# Patient Record
Sex: FEMALE | Race: WHITE | Employment: UNEMPLOYED | URBAN - METROPOLITAN AREA
[De-identification: names, ages, dates, MRNs, and addresses within clinical notes are randomized per-mention and may not be internally consistent; named-entity substitution may affect disease eponyms.]

---

## 2020-05-03 NOTE — PROGRESS NOTES
Physician on call answering page. Patient describing 8 days of intermittent Shortness of breath with mild runny nose without other symptoms of fever,cough, sore throat or n/v/d.  Advised patient that if her condition does not improve she should go to the ER

## 2020-05-05 NOTE — TELEPHONE ENCOUNTER
Action Requested: Summary for Provider     []  Critical Lab, Recommendations Needed  [] Need Additional Advice  []   FYI    []   Need Orders  [] Need Medications Sent to Pharmacy  []  Other     SUMMARY:   With  [id number]   Patient has not

## 2020-05-05 NOTE — PROGRESS NOTES
Virtual Telephone Check-In    [address] [facility] verbally consents to a Virtual/Telephone Check-In visit on 05/05/20.     Patient understands and accepts financial responsibility for any deductible, co-insurance and/or co-pays associated with this s shortness of breath, denies cough  CARDIOVASCULAR: denies chest pain  GI: denies abdominal pain and denies heartburn - positive diarrhea  NEURO: denies headaches  Musculoskeletal: no joint pain, back pain    EXAM:   There were no vitals taken for this visi

## 2020-05-12 NOTE — TELEPHONE ENCOUNTER
Dr. Blanquita Bergeron:    Patient seeking abx or other recommendations. Patient states she feels her chest congested. Feels like she has a need to cough, but unable to. Feels cold sensation in chest.  Discomfort. Denied coughing spells.      Also sees blisters in

## 2020-05-12 NOTE — TELEPHONE ENCOUNTER
Spoke with  Nataliia Alonzo ID # [id number], pt  verified. Pt informed of MD recommendation, pt stated understanding.

## 2020-08-19 NOTE — PROGRESS NOTES
8/19/2020  10:06 AM    Gely Mortensen is a 63year old female. Chief complaint(s): Patient presents with:  Routine Physical: PAP    HPI:     Gely Mortensen primary complaint is regarding CPE.      Gely Mortensen is a 5 CURETTAGE  2010   • HYSTEROSCOPY     • HYSTEROSCOPY  2010    Per [employer]:  \"hysteroscopy - D&C. \"   •   ,    • OTHER Right     MOHS left eye   • OTHER SURGICAL HISTORY Left 2010    (left breast cancer) surgery      Famil Positive for abdominal pain (bloated). Negative for heartburn, nausea, vomiting, constipation and blood in stool. Endocrine: Negative for polydipsia and polyuria. Genitourinary: Positive for bladder incontinence.  Negative for dysuria, vaginal discharge Musculoskeletal:      Comments: Spine without scoliosis or kyphosis. Range of motions of both upper and lower extremities are normal.   Lymphadenopathy:   There are no cervical, axillary or inguinal lymphadenopathy. Lower extremities without edema.    [place] [facility] dental care with her dentist), and healthy habits & social competence & responsibilities: Recommendations on physical activity; exercise daily or at least 3 times a week for 30-60 minutes doing cardiovascular exercise.  Patient educated on self breast exami

## 2021-03-29 NOTE — PROGRESS NOTES
3/29/2021  2:02 PM    Samanta Cardona is a 62year old female.     Chief complaint(s): Patient presents with:  Shortness Of Breath: SOB, chest tightness, denies any cough,     HPI:     Alease Crystal primary complaint is regarding mul eye   • OTHER SURGICAL HISTORY Left 07/2010    (left breast cancer) surgery      Family History   Problem Relation Age of Onset   • Cancer Sister         Family h/o Thyroid Cancer   • Cancer Brother         Family h/o Liver Cancer   • Breast Cancer Other and wheezing. Cardiovascular: Negative for chest pain. Gastrointestinal: Positive for abdominal pain and constipation. Negative for nausea and vomiting. Musculoskeletal: Negative for back pain. Skin: Negative for rash.         Fatty tumor   Neurolo 72 46 - 118 U/L    Bilirubin, Total 0.5 0.1 - 2.0 mg/dL    Total Protein 7.6 6.4 - 8.2 g/dL    Albumin 4.3 3.4 - 5.0 g/dL    Globulin  3.3 2.8 - 4.4 g/dL    A/G Ratio 1.3 1.0 - 2.0    FASTING Yes    HEMOGLOBIN A1C   Result Value Ref Range    HgbA1C 5.2 <5. 4.00 x10(3) uL    Monocyte Absolute 0.33 0.10 - 1.00 x10(3) uL    Eosinophil Absolute 0.09 0.00 - 0.70 x10(3) uL    Basophil Absolute 0.03 0.00 - 0.20 x10(3) uL    Immature Granulocyte Absolute 0.02 0.00 - 1.00 x10(3) uL    Neutrophil % 63.0 %    Lymphocyt [facility] if there is a deterioration or worsening of the medical condition. Also, inform the doctor with any new symptoms or medications' side effects.       FOLLOW-UP: Schedule a follow-up visit in  prn.        5. Lipoma of left lower extremity  RTC

## 2021-04-16 ENCOUNTER — TELEPHONE (OUTPATIENT)
Dept: FAMILY MEDICINE CLINIC | Facility: CLINIC | Age: 61
End: 2021-04-16

## 2021-04-19 NOTE — TELEPHONE ENCOUNTER
Phone call made s/t  pt to help schedule appt for procedure. Per patient she would like to hold the procedure and call back since she is scheduled to receive her COVID vaccine.

## 2021-09-07 NOTE — PROGRESS NOTES
9/7/2021  1:46 PM    Rodney Smallwood is a 62year old female. Chief complaint(s): Patient presents with:  Breathing Problem    HPI:     Rodney Smallwood primary complaint is regarding SOB.      Patient is a 59-year-old female who pre Sister         Family h/o Thyroid Cancer   • Cancer Brother         Family h/o Liver Cancer   • Breast Cancer Other    • Heart Disease Mother 80   • Other (accidental death) Father 77   • Other (oral cancer) Brother    • Other (alive and well) Son pain.   Gastrointestinal: Negative for abdominal pain, nausea and vomiting. Musculoskeletal: Negative for back pain. Skin: Negative for rash. Neurological: Negative for dizziness and headaches. Psychiatric/Behavioral: Negative for dysphoric mood.  Sylvia Minaya Visit:  Requested Prescriptions      No prescriptions requested or ordered in this encounter       Imaging & Referrals:  Marsha Hardin MD

## 2022-04-11 ENCOUNTER — HOSPITAL ENCOUNTER (OUTPATIENT)
Dept: CT IMAGING | Facility: HOSPITAL | Age: 62
Discharge: HOME | End: 2022-04-11
Attending: FAMILY MEDICINE

## 2022-04-11 ENCOUNTER — HOSPITAL ENCOUNTER (OUTPATIENT)
Dept: MAMMOGRAPHY | Facility: HOSPITAL | Age: 62
Discharge: HOME | End: 2022-04-11
Attending: FAMILY MEDICINE

## 2022-04-11 DIAGNOSIS — R10.32 ABDOMINAL PAIN, LLQ: ICD-10-CM

## 2022-04-11 DIAGNOSIS — Z85.3 HISTORY OF LEFT BREAST CANCER: ICD-10-CM

## 2022-04-11 LAB — CREAT BLD-MCNC: 0.6 MG/DL

## 2022-04-11 PROCEDURE — 77063 BREAST TOMOSYNTHESIS BI: CPT | Performed by: FAMILY MEDICINE

## 2022-04-11 PROCEDURE — 82565 ASSAY OF CREATININE: CPT

## 2022-04-11 PROCEDURE — 74177 CT ABD & PELVIS W/CONTRAST: CPT | Performed by: FAMILY MEDICINE

## 2022-04-11 PROCEDURE — 71260 CT THORAX DX C+: CPT | Performed by: FAMILY MEDICINE

## 2022-04-11 PROCEDURE — 77067 SCR MAMMO BI INCL CAD: CPT | Performed by: FAMILY MEDICINE

## 2022-04-12 ENCOUNTER — TELEPHONE (OUTPATIENT)
Dept: FAMILY MEDICINE CLINIC | Facility: CLINIC | Age: 62
End: 2022-04-12

## 2022-04-18 ENCOUNTER — OFFICE VISIT (OUTPATIENT)
Dept: FAMILY MEDICINE CLINIC | Facility: CLINIC | Age: 62
End: 2022-04-18

## 2022-04-18 VITALS
SYSTOLIC BLOOD PRESSURE: 109 MMHG | WEIGHT: 110.19 LBS | HEIGHT: 67 IN | DIASTOLIC BLOOD PRESSURE: 62 MMHG | BODY MASS INDEX: 17.29 KG/M2 | TEMPERATURE: 98 F | HEART RATE: 82 BPM

## 2022-04-18 DIAGNOSIS — K76.9 LIVER LESION: ICD-10-CM

## 2022-04-18 DIAGNOSIS — R10.32 ABDOMINAL PAIN, LLQ: Primary | ICD-10-CM

## 2022-04-18 DIAGNOSIS — K42.9 UMBILICAL HERNIA WITHOUT OBSTRUCTION AND WITHOUT GANGRENE: ICD-10-CM

## 2022-04-18 DIAGNOSIS — Z85.3 HISTORY OF LEFT BREAST CANCER: ICD-10-CM

## 2022-04-18 PROCEDURE — 3074F SYST BP LT 130 MM HG: CPT | Performed by: FAMILY MEDICINE

## 2022-04-18 PROCEDURE — 3078F DIAST BP <80 MM HG: CPT | Performed by: FAMILY MEDICINE

## 2022-04-18 PROCEDURE — 3008F BODY MASS INDEX DOCD: CPT | Performed by: FAMILY MEDICINE

## 2022-04-18 PROCEDURE — 99213 OFFICE O/P EST LOW 20 MIN: CPT | Performed by: FAMILY MEDICINE

## 2022-05-18 ENCOUNTER — TELEPHONE (OUTPATIENT)
Dept: HEMATOLOGY/ONCOLOGY | Facility: HOSPITAL | Age: 62
End: 2022-05-18

## 2022-05-18 NOTE — TELEPHONE ENCOUNTER
Patient called using language line Tj Beltran #[id number]). Patient scheduled for consult appointment with Dr. Larissa Alas 5/27/22 at 2pm. Will have scheduling call patient to schedule MRI of liver ordered by Dr. Woo Jacobs prior to consult appointment. Patient verbalizes understanding.

## 2022-05-18 NOTE — TELEPHONE ENCOUNTER
Patient called and wanted a consult with Dr. Lucia King. Dr. Nadya Mckeon ordered a CT scan. He is not positive patient has cancer, but he did see some tumors in patient's liver. The patient has seen you in the past. Can you please give me a consult date when you have a chance. Thank you.

## 2022-05-27 ENCOUNTER — APPOINTMENT (OUTPATIENT)
Dept: HEMATOLOGY/ONCOLOGY | Facility: HOSPITAL | Age: 62
End: 2022-05-27
Attending: INTERNAL MEDICINE
Payer: COMMERCIAL

## 2022-05-27 VITALS
OXYGEN SATURATION: 99 % | TEMPERATURE: 98 F | WEIGHT: 109 LBS | DIASTOLIC BLOOD PRESSURE: 61 MMHG | HEART RATE: 74 BPM | RESPIRATION RATE: 16 BRPM | SYSTOLIC BLOOD PRESSURE: 121 MMHG | HEIGHT: 67 IN | BODY MASS INDEX: 17.11 KG/M2

## 2022-05-27 DIAGNOSIS — R22.1 MASS OF THYROID REGION: ICD-10-CM

## 2022-05-27 DIAGNOSIS — K76.9 LESION OF LIVER: Primary | ICD-10-CM

## 2022-05-27 DIAGNOSIS — Z85.3 HISTORY OF LEFT BREAST CANCER: ICD-10-CM

## 2022-05-27 PROCEDURE — 99211 OFF/OP EST MAY X REQ PHY/QHP: CPT

## 2022-05-27 RX ORDER — LORAZEPAM 0.5 MG/1
TABLET ORAL
Qty: 2 TABLET | Refills: 0 | Status: SHIPPED | OUTPATIENT
Start: 2022-05-27

## 2022-06-16 ENCOUNTER — TELEPHONE (OUTPATIENT)
Dept: HEMATOLOGY/ONCOLOGY | Facility: HOSPITAL | Age: 62
End: 2022-06-16

## 2022-06-16 NOTE — TELEPHONE ENCOUNTER
Called patient and discussed that results of the MRI of the liver done on a bright light which will be scanned in the system, was consistent with liver lesions being hemangiomas. Discussed with the patient that this is not cancer and that she does not need any further work-up. She states she did do the ultrasound of the neck there as well, I have not gotten the report. We will call to get the report and I will call her back with that information. If everything is negative and no appearance of malignancy, no further follow-up with myself, she can continue follow-up with her primary care doctor, Dr. Ronnie Germain.   She states she still having abdominal issues and I recommend that that she asked Dr. Ronnie Germain for referral for a gastroenterologist.

## 2022-06-16 NOTE — TELEPHONE ENCOUNTER
Called back patient and discussed that US of the neck, the nodule that the patient had pointed out on the R  Neck is 2.5 cm and is solid/almost completely solid. This was recommended for ultrasound-guided fine-needle aspiration as was felt to be suspicious. I discussed this with the patient I will be referring the patient back to her primary care doctor for further work-up on this thyroid nodule.

## 2022-06-22 ENCOUNTER — TELEPHONE (OUTPATIENT)
Dept: HEMATOLOGY/ONCOLOGY | Facility: HOSPITAL | Age: 62
End: 2022-06-22

## 2022-06-22 NOTE — TELEPHONE ENCOUNTER
Patient needs a referral from Dr. Tom Contreras for a biopsy and ultrasound. Please advise patient when done.

## 2022-06-22 NOTE — TELEPHONE ENCOUNTER
Called patient with use of the language line interpretor Morena [id number] that Dr. Arch Sandifer did review the scans and is referring patient back to PCP to order biopsy and US- patient stated understanding and will call PCP. Provided our number to call back if she has any further questions.

## 2022-06-24 ENCOUNTER — TELEPHONE (OUTPATIENT)
Dept: FAMILY MEDICINE CLINIC | Facility: CLINIC | Age: 62
End: 2022-06-24

## 2022-06-24 NOTE — TELEPHONE ENCOUNTER
Pt would like to know if Dr. Siva Anglin can give her an order for an ultra sound and biopsy of the thyroid. Per the patient Dr. Donald Phillips told her she should request the order from her PCP. Please, call pt when the order is in the system.

## 2022-07-12 ENCOUNTER — OFFICE VISIT (OUTPATIENT)
Dept: FAMILY MEDICINE CLINIC | Facility: CLINIC | Age: 62
End: 2022-07-12
Payer: COMMERCIAL

## 2022-07-12 ENCOUNTER — LAB ENCOUNTER (OUTPATIENT)
Dept: LAB | Age: 62
End: 2022-07-12
Attending: FAMILY MEDICINE
Payer: COMMERCIAL

## 2022-07-12 VITALS
HEART RATE: 66 BPM | DIASTOLIC BLOOD PRESSURE: 77 MMHG | WEIGHT: 104.19 LBS | BODY MASS INDEX: 16.35 KG/M2 | HEIGHT: 67 IN | SYSTOLIC BLOOD PRESSURE: 134 MMHG

## 2022-07-12 DIAGNOSIS — Z12.11 COLON CANCER SCREENING: ICD-10-CM

## 2022-07-12 DIAGNOSIS — Z00.00 PHYSICAL EXAM: Primary | ICD-10-CM

## 2022-07-12 DIAGNOSIS — R19.4 CHANGE IN BOWEL HABIT: ICD-10-CM

## 2022-07-12 DIAGNOSIS — E04.1 THYROID NODULE: ICD-10-CM

## 2022-07-12 DIAGNOSIS — Z00.00 PHYSICAL EXAM: ICD-10-CM

## 2022-07-12 LAB
ALBUMIN SERPL-MCNC: 4.3 G/DL (ref 3.4–5)
ALBUMIN/GLOB SERPL: 1.2 {RATIO} (ref 1–2)
ALP LIVER SERPL-CCNC: 69 U/L
ALT SERPL-CCNC: 29 U/L
ANION GAP SERPL CALC-SCNC: 7 MMOL/L (ref 0–18)
AST SERPL-CCNC: 18 U/L (ref 15–37)
BASOPHILS # BLD AUTO: 0.05 X10(3) UL (ref 0–0.2)
BASOPHILS NFR BLD AUTO: 0.7 %
BILIRUB SERPL-MCNC: 0.7 MG/DL (ref 0.1–2)
BILIRUB UR QL: NEGATIVE
BUN BLD-MCNC: 10 MG/DL (ref 7–18)
BUN/CREAT SERPL: 13.7 (ref 10–20)
CALCIUM BLD-MCNC: 9.5 MG/DL (ref 8.5–10.1)
CHLORIDE SERPL-SCNC: 108 MMOL/L (ref 98–112)
CHOLEST SERPL-MCNC: 138 MG/DL (ref ?–200)
CLARITY UR: CLEAR
CO2 SERPL-SCNC: 27 MMOL/L (ref 21–32)
COLOR UR: YELLOW
CREAT BLD-MCNC: 0.73 MG/DL
DEPRECATED RDW RBC AUTO: 41 FL (ref 35.1–46.3)
EOSINOPHIL # BLD AUTO: 0.12 X10(3) UL (ref 0–0.7)
EOSINOPHIL NFR BLD AUTO: 1.7 %
ERYTHROCYTE [DISTWIDTH] IN BLOOD BY AUTOMATED COUNT: 12 % (ref 11–15)
EST. AVERAGE GLUCOSE BLD GHB EST-MCNC: 111 MG/DL (ref 68–126)
FASTING PATIENT LIPID ANSWER: YES
FASTING STATUS PATIENT QL REPORTED: YES
GLOBULIN PLAS-MCNC: 3.5 G/DL (ref 2.8–4.4)
GLUCOSE BLD-MCNC: 102 MG/DL (ref 70–99)
GLUCOSE UR-MCNC: NEGATIVE MG/DL
HBA1C MFR BLD: 5.5 % (ref ?–5.7)
HCT VFR BLD AUTO: 39.4 %
HDLC SERPL-MCNC: 48 MG/DL (ref 40–59)
HGB BLD-MCNC: 12.7 G/DL
IMM GRANULOCYTES # BLD AUTO: 0.03 X10(3) UL (ref 0–1)
IMM GRANULOCYTES NFR BLD: 0.4 %
KETONES UR-MCNC: NEGATIVE MG/DL
LDLC SERPL CALC-MCNC: 73 MG/DL (ref ?–100)
LYMPHOCYTES # BLD AUTO: 1.44 X10(3) UL (ref 1–4)
LYMPHOCYTES NFR BLD AUTO: 20.1 %
MCH RBC QN AUTO: 30.1 PG (ref 26–34)
MCHC RBC AUTO-ENTMCNC: 32.2 G/DL (ref 31–37)
MCV RBC AUTO: 93.4 FL
MONOCYTES # BLD AUTO: 0.37 X10(3) UL (ref 0.1–1)
MONOCYTES NFR BLD AUTO: 5.2 %
NEUTROPHILS # BLD AUTO: 5.15 X10 (3) UL (ref 1.5–7.7)
NEUTROPHILS # BLD AUTO: 5.15 X10(3) UL (ref 1.5–7.7)
NEUTROPHILS NFR BLD AUTO: 71.9 %
NITRITE UR QL STRIP.AUTO: NEGATIVE
NONHDLC SERPL-MCNC: 90 MG/DL (ref ?–130)
OSMOLALITY SERPL CALC.SUM OF ELEC: 293 MOSM/KG (ref 275–295)
PH UR: 6 [PH] (ref 5–8)
PLATELET # BLD AUTO: 136 10(3)UL (ref 150–450)
POTASSIUM SERPL-SCNC: 5.1 MMOL/L (ref 3.5–5.1)
PROT SERPL-MCNC: 7.8 G/DL (ref 6.4–8.2)
PROT UR-MCNC: NEGATIVE MG/DL
RBC # BLD AUTO: 4.22 X10(6)UL
SODIUM SERPL-SCNC: 142 MMOL/L (ref 136–145)
SP GR UR STRIP: 1.01 (ref 1–1.03)
TRIGL SERPL-MCNC: 88 MG/DL (ref 30–149)
TSI SER-ACNC: 2.81 MIU/ML (ref 0.36–3.74)
UROBILINOGEN UR STRIP-ACNC: <2
VIT C UR-MCNC: NEGATIVE MG/DL
VIT D+METAB SERPL-MCNC: 18.5 NG/ML (ref 30–100)
VLDLC SERPL CALC-MCNC: 14 MG/DL (ref 0–30)
WBC # BLD AUTO: 7.2 X10(3) UL (ref 4–11)

## 2022-07-12 PROCEDURE — 80053 COMPREHEN METABOLIC PANEL: CPT

## 2022-07-12 PROCEDURE — 84443 ASSAY THYROID STIM HORMONE: CPT

## 2022-07-12 PROCEDURE — 3008F BODY MASS INDEX DOCD: CPT | Performed by: FAMILY MEDICINE

## 2022-07-12 PROCEDURE — 99396 PREV VISIT EST AGE 40-64: CPT | Performed by: FAMILY MEDICINE

## 2022-07-12 PROCEDURE — 81001 URINALYSIS AUTO W/SCOPE: CPT

## 2022-07-12 PROCEDURE — 90471 IMMUNIZATION ADMIN: CPT | Performed by: FAMILY MEDICINE

## 2022-07-12 PROCEDURE — 36415 COLL VENOUS BLD VENIPUNCTURE: CPT

## 2022-07-12 PROCEDURE — 82306 VITAMIN D 25 HYDROXY: CPT

## 2022-07-12 PROCEDURE — 83036 HEMOGLOBIN GLYCOSYLATED A1C: CPT

## 2022-07-12 PROCEDURE — 85025 COMPLETE CBC W/AUTO DIFF WBC: CPT

## 2022-07-12 PROCEDURE — 80061 LIPID PANEL: CPT

## 2022-07-12 PROCEDURE — 93010 ELECTROCARDIOGRAM REPORT: CPT | Performed by: FAMILY MEDICINE

## 2022-07-12 PROCEDURE — 3075F SYST BP GE 130 - 139MM HG: CPT | Performed by: FAMILY MEDICINE

## 2022-07-12 PROCEDURE — 90715 TDAP VACCINE 7 YRS/> IM: CPT | Performed by: FAMILY MEDICINE

## 2022-07-12 PROCEDURE — 87086 URINE CULTURE/COLONY COUNT: CPT

## 2022-07-12 PROCEDURE — 93005 ELECTROCARDIOGRAM TRACING: CPT

## 2022-07-12 PROCEDURE — 3078F DIAST BP <80 MM HG: CPT | Performed by: FAMILY MEDICINE

## 2022-07-12 RX ORDER — ERGOCALCIFEROL 1.25 MG/1
50000 CAPSULE ORAL WEEKLY
Qty: 12 CAPSULE | Refills: 4 | Status: SHIPPED | OUTPATIENT
Start: 2022-07-12 | End: 2022-08-11

## 2022-07-12 NOTE — PROGRESS NOTES
Please notify patient that his/her blood test showed low levels of vitamin D. A prescription was sent to his pharmacy to take one capsule or tablet, once a week. This Rx is good for one year. We'll recheck levels in one year.     Normal EKG

## 2022-07-14 ENCOUNTER — TELEPHONE (OUTPATIENT)
Dept: GASTROENTEROLOGY | Facility: CLINIC | Age: 62
End: 2022-07-14

## 2022-07-14 ENCOUNTER — OFFICE VISIT (OUTPATIENT)
Dept: GASTROENTEROLOGY | Facility: CLINIC | Age: 62
End: 2022-07-14

## 2022-07-14 VITALS
SYSTOLIC BLOOD PRESSURE: 119 MMHG | DIASTOLIC BLOOD PRESSURE: 67 MMHG | WEIGHT: 105 LBS | BODY MASS INDEX: 16.48 KG/M2 | HEART RATE: 76 BPM | HEIGHT: 67 IN

## 2022-07-14 DIAGNOSIS — R19.4 FREQUENT BOWEL MOVEMENTS: ICD-10-CM

## 2022-07-14 DIAGNOSIS — Z85.3 PERSONAL HISTORY OF MALIGNANT NEOPLASM OF BREAST: Primary | ICD-10-CM

## 2022-07-14 DIAGNOSIS — Z85.3 HISTORY OF BREAST CANCER: ICD-10-CM

## 2022-07-14 DIAGNOSIS — Z86.010 PERSONAL HISTORY OF COLONIC POLYPS: Primary | ICD-10-CM

## 2022-07-14 DIAGNOSIS — R10.9 LEFT FLANK PAIN: ICD-10-CM

## 2022-07-14 DIAGNOSIS — D18.03 HEPATIC HEMANGIOMA: ICD-10-CM

## 2022-07-14 DIAGNOSIS — R19.4 CHANGE IN BOWEL HABITS: ICD-10-CM

## 2022-07-14 DIAGNOSIS — Z86.010 PERSONAL HISTORY OF COLONIC POLYPS: ICD-10-CM

## 2022-07-14 DIAGNOSIS — R10.9 LEFT SIDED ABDOMINAL PAIN: ICD-10-CM

## 2022-07-14 PROCEDURE — 99244 OFF/OP CNSLTJ NEW/EST MOD 40: CPT | Performed by: NURSE PRACTITIONER

## 2022-07-14 PROCEDURE — 3074F SYST BP LT 130 MM HG: CPT | Performed by: NURSE PRACTITIONER

## 2022-07-14 PROCEDURE — 3008F BODY MASS INDEX DOCD: CPT | Performed by: NURSE PRACTITIONER

## 2022-07-14 PROCEDURE — 3078F DIAST BP <80 MM HG: CPT | Performed by: NURSE PRACTITIONER

## 2022-07-14 RX ORDER — POLYETHYLENE GLYCOL 3350, SODIUM CHLORIDE, SODIUM BICARBONATE, POTASSIUM CHLORIDE 420; 11.2; 5.72; 1.48 G/4L; G/4L; G/4L; G/4L
POWDER, FOR SOLUTION ORAL
Qty: 4000 ML | Refills: 0 | Status: SHIPPED | OUTPATIENT
Start: 2022-07-14

## 2022-07-14 NOTE — TELEPHONE ENCOUNTER
Scheduled for:  Colonoscopy 55562  Provider Name: Dr Tanmay Santacruz  Date: Wed 7/20/2022   Location: [facility]   Sedation: MAC  Time: 2 pm   Prep: split trilyte   Meds/Allergies Reconciled?:  Reviewed by provider  Diagnosis with codes: Left abd pain R10.9, Hx of breast cancer Z85.3, HCP Z86.010, change in bowel R19.4  Was patient informed to call insurance with codes (Y/N):  Yes  Referral sent?: Yes    [address] [place] [address] or [address] [place] [address] notified?:  I sent an electronic request to Endo Scheduling and received a confirmation today. Medication Orders: -Buy over the counter dulcolax laxative, and take one tablet daily for 3 days prior to drinking the bowel prep. Pt is aware to NOT take iron pills, herbal meds and diet supplements for 7 days before exam. Also to NOT take any form of alcohol, recreational drugs and any forms of ED meds 24 hours before exam.      Misc Orders:  Patient was informed that they will need a COVID 19 test prior to their procedure. Patient verbally understood & will await a phone call from [facility] to schedule. Further instructions given by staff:   Instructions given in Faroese and pt verbalized understanding

## 2022-07-14 NOTE — PATIENT INSTRUCTIONS
Plan:  -Start miralax in am and fibercon or citrucel in evening  -squatty potty  -If worsening pain and/or having fever/chills/ condition decline switch to liquid diet and report to er  -ultrasound with FNA        1. Schedule colonoscopy with [facility] w/ [facility] MD [Diagnosis: h/o breast ca, left sided pain, change in bowel habits, history of colon polyps]    2.  bowel prep from pharmacy (split trilyte -Buy over the counter dulcolax laxative, and take one tablet daily for 3 days prior to drinking the bowel prep. )    3. Continue all medications for procedure    4. Read all bowel prep instructions carefully    5. AVOID seeds, nuts, popcorn, raw fruits and vegetables (cooked is okay) for 2-3 days before procedure    6. You will need to be tested for COVID within 72 hours of your procedure. You will be contacted with instructions on how to do this.       >>>Please note: if you were prescribed Suprep for the bowel prep and it is too expensive or not covered by insurance, it is okay to substitute Trilyte (or any similar generic prep). This can be done by notifying the pharmacy or calling our office.

## 2022-07-20 ENCOUNTER — ANESTHESIA (OUTPATIENT)
Dept: ENDOSCOPY | Facility: HOSPITAL | Age: 62
End: 2022-07-20
Payer: COMMERCIAL

## 2022-07-20 ENCOUNTER — ANESTHESIA EVENT (OUTPATIENT)
Dept: ENDOSCOPY | Facility: HOSPITAL | Age: 62
End: 2022-07-20
Payer: COMMERCIAL

## 2022-07-20 ENCOUNTER — HOSPITAL ENCOUNTER (OUTPATIENT)
Facility: HOSPITAL | Age: 62
Setting detail: HOSPITAL OUTPATIENT SURGERY
Discharge: HOME | End: 2022-07-20
Attending: INTERNAL MEDICINE | Admitting: INTERNAL MEDICINE
Payer: COMMERCIAL

## 2022-07-20 VITALS
BODY MASS INDEX: 16.48 KG/M2 | SYSTOLIC BLOOD PRESSURE: 126 MMHG | RESPIRATION RATE: 12 BRPM | OXYGEN SATURATION: 100 % | DIASTOLIC BLOOD PRESSURE: 59 MMHG | WEIGHT: 105 LBS | HEART RATE: 60 BPM | HEIGHT: 67 IN

## 2022-07-20 DIAGNOSIS — R19.4 FREQUENT BOWEL MOVEMENTS: ICD-10-CM

## 2022-07-20 DIAGNOSIS — Q43.8 TORTUOUS COLON: Primary | ICD-10-CM

## 2022-07-20 DIAGNOSIS — Q43.8 REDUNDANT COLON: ICD-10-CM

## 2022-07-20 DIAGNOSIS — Z85.3 PERSONAL HISTORY OF MALIGNANT NEOPLASM OF BREAST: ICD-10-CM

## 2022-07-20 DIAGNOSIS — Z86.010 PERSONAL HISTORY OF COLONIC POLYPS: ICD-10-CM

## 2022-07-20 DIAGNOSIS — K63.5 POLYP OF TRANSVERSE COLON, UNSPECIFIED TYPE: ICD-10-CM

## 2022-07-20 DIAGNOSIS — R10.9 LEFT FLANK PAIN: ICD-10-CM

## 2022-07-20 PROCEDURE — 45380 COLONOSCOPY AND BIOPSY: CPT | Performed by: INTERNAL MEDICINE

## 2022-07-20 PROCEDURE — 0DBL8ZX EXCISION OF TRANSVERSE COLON, VIA NATURAL OR ARTIFICIAL OPENING ENDOSCOPIC, DIAGNOSTIC: ICD-10-PCS | Performed by: INTERNAL MEDICINE

## 2022-07-20 RX ORDER — SODIUM CHLORIDE, SODIUM LACTATE, POTASSIUM CHLORIDE, CALCIUM CHLORIDE 600; 310; 30; 20 MG/100ML; MG/100ML; MG/100ML; MG/100ML
INJECTION, SOLUTION INTRAVENOUS CONTINUOUS
Status: DISCONTINUED | OUTPATIENT
Start: 2022-07-20 | End: 2022-07-20

## 2022-07-20 RX ORDER — LIDOCAINE HYDROCHLORIDE 10 MG/ML
INJECTION, SOLUTION EPIDURAL; INFILTRATION; INTRACAUDAL; PERINEURAL AS NEEDED
Status: DISCONTINUED | OUTPATIENT
Start: 2022-07-20 | End: 2022-07-20 | Stop reason: SURG

## 2022-07-20 RX ADMIN — SODIUM CHLORIDE, SODIUM LACTATE, POTASSIUM CHLORIDE, CALCIUM CHLORIDE: 600; 310; 30; 20 INJECTION, SOLUTION INTRAVENOUS at 13:40:00

## 2022-07-20 RX ADMIN — LIDOCAINE HYDROCHLORIDE 50 MG: 10 INJECTION, SOLUTION EPIDURAL; INFILTRATION; INTRACAUDAL; PERINEURAL at 13:46:00

## 2022-07-20 RX ADMIN — SODIUM CHLORIDE, SODIUM LACTATE, POTASSIUM CHLORIDE, CALCIUM CHLORIDE: 600; 310; 30; 20 INJECTION, SOLUTION INTRAVENOUS at 14:51:00

## 2022-07-20 NOTE — ANESTHESIA POSTPROCEDURE EVALUATION
Patient: Mac Riley    Procedure Summary     Date: 07/20/22 Room / Location: [address] [place] [address] ENDOSCOPY 05 / [address] [place] [address] ENDOSCOPY    Anesthesia Start: 0520 Anesthesia Stop:     Procedure: COLONOSCOPY (N/A ) Diagnosis:       Personal history of malignant neoplasm of breast      Personal history of colonic polyps      Frequent bowel movements      Left flank pain      (redundant colon, tortuous colon, colon polyp, hemorrhoids)    Surgeons: Cecy Perla MD Anesthesiologist: Jasno Araiza CRNA    Anesthesia Type: MAC ASA Status: 2          Anesthesia Type: MAC    Vitals Value Taken Time   /59 07/20/22 1507   Temp  07/20/22 1507   Pulse 74 07/20/22 1507   Resp 20 07/20/22 1507   SpO2 98 % 07/20/22 1507       [facility] AN Post Evaluation:   Patient Evaluated in Patient location: endo 19. Patient Participation: complete - patient participated  Level of Consciousness: awake  Pain Score: 0  Pain Management: adequate  Airway Patency:patent  Dental exam unchanged from preop  Yes    Cardiovascular Status: stable  Respiratory Status: room air  Postoperative Hydration stable      Liarosalia Meade.  Rachel Chauhan CRNA  7/20/2022 3:07 PM

## 2022-07-27 ENCOUNTER — TELEPHONE (OUTPATIENT)
Dept: GASTROENTEROLOGY | Facility: CLINIC | Age: 62
End: 2022-07-27

## 2022-07-27 ENCOUNTER — TELEPHONE (OUTPATIENT)
Dept: FAMILY MEDICINE CLINIC | Facility: CLINIC | Age: 62
End: 2022-07-27

## 2022-07-27 NOTE — TELEPHONE ENCOUNTER
[facility] requesting a biopsy of the thyroid be entered for patient.     Fax to [phone]

## 2022-07-28 DIAGNOSIS — E04.1 THYROID NODULE: Primary | ICD-10-CM

## 2022-07-28 NOTE — TELEPHONE ENCOUNTER
Central scheduling called regarding US order, patient reported she did US thyroid. Was completed at [address] [place] [address] 6/9/22, see scan from 6/21. Per report recommendation: ultrasound-guided fine needle aspiration. Requesting order for US biopsy, please advise.

## 2022-08-02 ENCOUNTER — LAB ENCOUNTER (OUTPATIENT)
Dept: LAB | Age: 62
End: 2022-08-02
Attending: FAMILY MEDICINE

## 2022-08-02 DIAGNOSIS — E04.1 THYROID NODULE: ICD-10-CM

## 2022-08-02 LAB — SARS-COV-2 RNA RESP QL NAA+PROBE: NOT DETECTED

## 2022-08-05 ENCOUNTER — HOSPITAL ENCOUNTER (OUTPATIENT)
Dept: ULTRASOUND IMAGING | Facility: HOSPITAL | Age: 62
Discharge: HOME | End: 2022-08-05
Attending: FAMILY MEDICINE

## 2022-08-05 VITALS — DIASTOLIC BLOOD PRESSURE: 72 MMHG | SYSTOLIC BLOOD PRESSURE: 121 MMHG | HEART RATE: 64 BPM

## 2022-08-05 DIAGNOSIS — E04.1 THYROID NODULE: ICD-10-CM

## 2022-08-05 PROCEDURE — 10005 FNA BX W/US GDN 1ST LES: CPT | Performed by: FAMILY MEDICINE

## 2022-08-05 PROCEDURE — 88173 CYTOPATH EVAL FNA REPORT: CPT | Performed by: FAMILY MEDICINE

## 2022-08-05 PROCEDURE — 88172 CYTP DX EVAL FNA 1ST EA SITE: CPT | Performed by: FAMILY MEDICINE

## 2022-08-05 PROCEDURE — 88177 CYTP FNA EVAL EA ADDL: CPT | Performed by: FAMILY MEDICINE

## 2022-08-05 NOTE — IMAGING NOTE
Pt arrived to Radiology Holding area. 1307:/75, HR 71    1310 [employer], [id number] # [id number]. History taken and as follows: Pt felt a lump in her neck, had US which showed nodule. Procedure explained questions answered. 1324 Consent verified and obtained      1338 Pt to ultrasound room #3     1342 Scans taken by Rhett Gustafson, ultrasound sonographers     1430  scans reviewed by Dr. Ramachandran Serum    1412 7760 Time out taken; Heidi Shaulis tech translated for pt      Site marked [phone] Area cleaned sterile towels  to site. Pathology was notified     1402 Lidocaine 1% 10 milligrams per ml  from kit  was given 1 ml         FNA # 1 taken at  1407 with 22 g needle    FNA # 2 taken at [phone]  with 22 g needle    1411 Procedure completed area re scanned . Area cleaned band aid to site ice pack to site. [facility] reviewed, verbal et written with AVS summary sheet provided to patient. Also instructed patient to refrain from drinking or eating anything hot for several hours after biopsy  to prevent increase bleeding from occurring. 1418 /72, HR 64    1422 Pt  discharged .

## 2022-08-11 ENCOUNTER — OFFICE VISIT (OUTPATIENT)
Dept: FAMILY MEDICINE CLINIC | Facility: CLINIC | Age: 62
End: 2022-08-11

## 2022-08-11 VITALS
HEART RATE: 74 BPM | DIASTOLIC BLOOD PRESSURE: 72 MMHG | SYSTOLIC BLOOD PRESSURE: 138 MMHG | HEIGHT: 67 IN | BODY MASS INDEX: 16.13 KG/M2 | WEIGHT: 102.81 LBS

## 2022-08-11 DIAGNOSIS — K64.2 GRADE III HEMORRHOIDS: ICD-10-CM

## 2022-08-11 DIAGNOSIS — K59.01 SLOW TRANSIT CONSTIPATION: ICD-10-CM

## 2022-08-11 DIAGNOSIS — R22.1 NECK MASS: ICD-10-CM

## 2022-08-11 DIAGNOSIS — K58.2 IRRITABLE BOWEL SYNDROME WITH BOTH CONSTIPATION AND DIARRHEA: Primary | ICD-10-CM

## 2022-08-11 DIAGNOSIS — E04.1 THYROID NODULE: ICD-10-CM

## 2022-08-11 PROCEDURE — 3078F DIAST BP <80 MM HG: CPT | Performed by: FAMILY MEDICINE

## 2022-08-11 PROCEDURE — 3008F BODY MASS INDEX DOCD: CPT | Performed by: FAMILY MEDICINE

## 2022-08-11 PROCEDURE — 3075F SYST BP GE 130 - 139MM HG: CPT | Performed by: FAMILY MEDICINE

## 2022-08-11 PROCEDURE — 99214 OFFICE O/P EST MOD 30 MIN: CPT | Performed by: FAMILY MEDICINE

## 2022-08-11 RX ORDER — DICYCLOMINE HCL 20 MG
20 TABLET ORAL
Qty: 120 TABLET | Refills: 1 | Status: SHIPPED | OUTPATIENT
Start: 2022-08-11

## 2022-08-18 ENCOUNTER — OFFICE VISIT (OUTPATIENT)
Dept: OTOLARYNGOLOGY | Facility: CLINIC | Age: 62
End: 2022-08-18

## 2022-08-18 VITALS — HEIGHT: 67 IN | BODY MASS INDEX: 16.01 KG/M2 | TEMPERATURE: 98 F | WEIGHT: 102 LBS

## 2022-08-18 DIAGNOSIS — E04.1 THYROID NODULE: Primary | ICD-10-CM

## 2022-08-18 PROCEDURE — 99204 OFFICE O/P NEW MOD 45 MIN: CPT | Performed by: STUDENT IN AN ORGANIZED HEALTH CARE EDUCATION/TRAINING PROGRAM

## 2022-08-19 ENCOUNTER — TELEPHONE (OUTPATIENT)
Dept: OTOLARYNGOLOGY | Facility: CLINIC | Age: 62
End: 2022-08-19

## 2022-09-06 ENCOUNTER — OFFICE VISIT (OUTPATIENT)
Dept: GASTROENTEROLOGY | Facility: CLINIC | Age: 62
End: 2022-09-06

## 2022-09-06 VITALS
WEIGHT: 103 LBS | HEIGHT: 67 IN | BODY MASS INDEX: 16.17 KG/M2 | DIASTOLIC BLOOD PRESSURE: 73 MMHG | SYSTOLIC BLOOD PRESSURE: 123 MMHG | HEART RATE: 70 BPM

## 2022-09-06 DIAGNOSIS — K64.0 GRADE I HEMORRHOIDS: Primary | ICD-10-CM

## 2022-09-06 PROCEDURE — 99214 OFFICE O/P EST MOD 30 MIN: CPT | Performed by: INTERNAL MEDICINE

## 2022-09-06 RX ORDER — GARLIC EXTRACT 500 MG
1 CAPSULE ORAL DAILY
COMMUNITY

## 2022-09-06 NOTE — PATIENT INSTRUCTIONS
PLAN      - Try Benefiber or Metamucil 2 scoops daily   - OK to increase to twice a day if needed      - See Dr. Antonio Owen from general surgery when able to discuss options for your hemorrhoids      - Follow up in 1 month

## 2022-09-08 NOTE — TELEPHONE ENCOUNTER
Seen in clinic for procedure f/u visit 9/6/22. Dr. Prabhakar Livingston recommended repeat colonoscopy in 7 years per office notes. Entered into Epic. Recall CLN in 7 years per Dr. Prabhakar Livingston. Last CLN done 7/20/2022. Recall entered into Patient Outreach for 7/20/2029. Health Maintenance updated.

## 2022-11-03 ENCOUNTER — NURSE TRIAGE (OUTPATIENT)
Dept: FAMILY MEDICINE CLINIC | Facility: CLINIC | Age: 62
End: 2022-11-03

## 2022-11-03 ENCOUNTER — OFFICE VISIT (OUTPATIENT)
Dept: FAMILY MEDICINE CLINIC | Facility: CLINIC | Age: 62
End: 2022-11-03

## 2022-11-03 VITALS
HEART RATE: 81 BPM | DIASTOLIC BLOOD PRESSURE: 76 MMHG | WEIGHT: 104.63 LBS | BODY MASS INDEX: 16.42 KG/M2 | HEIGHT: 67 IN | SYSTOLIC BLOOD PRESSURE: 133 MMHG

## 2022-11-03 DIAGNOSIS — F51.01 PRIMARY INSOMNIA: ICD-10-CM

## 2022-11-03 DIAGNOSIS — K30 INDIGESTION: Primary | ICD-10-CM

## 2022-11-03 DIAGNOSIS — F41.8 DEPRESSION WITH ANXIETY: ICD-10-CM

## 2022-11-03 PROCEDURE — 99213 OFFICE O/P EST LOW 20 MIN: CPT | Performed by: FAMILY MEDICINE

## 2022-11-03 RX ORDER — ERGOCALCIFEROL 1.25 MG/1
50000 CAPSULE ORAL WEEKLY
COMMUNITY
Start: 2022-09-30

## 2022-11-03 RX ORDER — TEMAZEPAM 30 MG/1
30 CAPSULE ORAL NIGHTLY PRN
Qty: 90 CAPSULE | Refills: 1 | Status: SHIPPED | OUTPATIENT
Start: 2022-11-03

## 2022-11-03 RX ORDER — SIMETHICONE 125 MG
250 TABLET,CHEWABLE ORAL EVERY 6 HOURS PRN
Qty: 60 TABLET | Refills: 0 | Status: SHIPPED | OUTPATIENT
Start: 2022-11-03

## 2023-01-24 ENCOUNTER — OFFICE VISIT (OUTPATIENT)
Dept: OBGYN CLINIC | Facility: CLINIC | Age: 63
End: 2023-01-24

## 2023-01-24 VITALS — BODY MASS INDEX: 17 KG/M2 | WEIGHT: 107 LBS | DIASTOLIC BLOOD PRESSURE: 72 MMHG | SYSTOLIC BLOOD PRESSURE: 127 MMHG

## 2023-01-24 DIAGNOSIS — Z12.31 ENCOUNTER FOR SCREENING MAMMOGRAM FOR BREAST CANCER: Primary | ICD-10-CM

## 2023-01-24 PROCEDURE — 99386 PREV VISIT NEW AGE 40-64: CPT | Performed by: OBSTETRICS & GYNECOLOGY

## 2023-01-25 LAB — HPV I/H RISK 1 DNA SPEC QL NAA+PROBE: NEGATIVE

## 2023-02-01 ENCOUNTER — NURSE TRIAGE (OUTPATIENT)
Dept: FAMILY MEDICINE CLINIC | Facility: CLINIC | Age: 63
End: 2023-02-01

## 2023-02-02 ENCOUNTER — OFFICE VISIT (OUTPATIENT)
Dept: FAMILY MEDICINE CLINIC | Facility: CLINIC | Age: 63
End: 2023-02-02

## 2023-02-02 VITALS
DIASTOLIC BLOOD PRESSURE: 69 MMHG | TEMPERATURE: 98 F | SYSTOLIC BLOOD PRESSURE: 121 MMHG | HEIGHT: 67 IN | WEIGHT: 103 LBS | HEART RATE: 85 BPM | BODY MASS INDEX: 16.17 KG/M2

## 2023-02-02 DIAGNOSIS — R10.13 EPIGASTRIC ABDOMINAL PAIN: Primary | ICD-10-CM

## 2023-02-02 DIAGNOSIS — R14.1 FLATULENCE, ERUCTATION AND GAS PAIN: ICD-10-CM

## 2023-02-02 DIAGNOSIS — F41.9 ANXIETY: ICD-10-CM

## 2023-02-02 DIAGNOSIS — G89.29 CHRONIC ABDOMINAL PAIN: ICD-10-CM

## 2023-02-02 DIAGNOSIS — F41.8 DEPRESSION WITH ANXIETY: ICD-10-CM

## 2023-02-02 DIAGNOSIS — R14.2 ERUCTATION: ICD-10-CM

## 2023-02-02 DIAGNOSIS — R63.0 DECREASED APPETITE: ICD-10-CM

## 2023-02-02 DIAGNOSIS — R14.3 FLATULENCE, ERUCTATION AND GAS PAIN: ICD-10-CM

## 2023-02-02 DIAGNOSIS — R14.2 FLATULENCE, ERUCTATION AND GAS PAIN: ICD-10-CM

## 2023-02-02 DIAGNOSIS — R63.6 UNDERWEIGHT: ICD-10-CM

## 2023-02-02 DIAGNOSIS — R10.9 CHRONIC ABDOMINAL PAIN: ICD-10-CM

## 2023-02-02 PROCEDURE — 99214 OFFICE O/P EST MOD 30 MIN: CPT | Performed by: FAMILY MEDICINE

## 2023-02-02 RX ORDER — MIRTAZAPINE 15 MG/1
15 TABLET, FILM COATED ORAL NIGHTLY
Qty: 30 TABLET | Refills: 1 | Status: SHIPPED | OUTPATIENT
Start: 2023-02-02 | End: 2023-03-04

## 2023-02-17 ENCOUNTER — OFFICE VISIT (OUTPATIENT)
Dept: SURGERY | Facility: CLINIC | Age: 63
End: 2023-02-17

## 2023-02-17 VITALS — HEIGHT: 67 IN | BODY MASS INDEX: 16.17 KG/M2 | WEIGHT: 103 LBS

## 2023-02-17 DIAGNOSIS — L72.0 EPIDERMAL INCLUSION CYST: ICD-10-CM

## 2023-02-17 DIAGNOSIS — L72.9 SKIN CYST: Primary | ICD-10-CM

## 2023-02-17 PROCEDURE — 99204 OFFICE O/P NEW MOD 45 MIN: CPT | Performed by: SURGERY

## 2023-03-06 ENCOUNTER — LAB ENCOUNTER (OUTPATIENT)
Dept: LAB | Age: 63
End: 2023-03-06
Attending: SURGERY
Payer: MEDICAID

## 2023-03-06 DIAGNOSIS — Z01.818 PRE-OP TESTING: ICD-10-CM

## 2023-03-07 LAB — SARS-COV-2 RNA RESP QL NAA+PROBE: NOT DETECTED

## 2023-03-08 NOTE — DISCHARGE INSTRUCTIONS
OK to shower, diet as tolerated, tylenol or motrin prn, office in 2-3 weeks with Yuly Bermeo PA-C.   [place] [address] [facility],[address] IBUPROFEN LOREE LO Margaritaregine Foote [place] OFICINA EN 2-3 SEMANAS

## 2023-03-09 ENCOUNTER — HOSPITAL ENCOUNTER (OUTPATIENT)
Facility: HOSPITAL | Age: 63
Setting detail: HOSPITAL OUTPATIENT SURGERY
Discharge: HOME | End: 2023-03-09
Attending: SURGERY | Admitting: SURGERY

## 2023-03-09 VITALS
WEIGHT: 105 LBS | OXYGEN SATURATION: 100 % | BODY MASS INDEX: 16.48 KG/M2 | HEART RATE: 80 BPM | RESPIRATION RATE: 16 BRPM | TEMPERATURE: 98 F | SYSTOLIC BLOOD PRESSURE: 150 MMHG | HEIGHT: 67 IN | DIASTOLIC BLOOD PRESSURE: 64 MMHG

## 2023-03-09 DIAGNOSIS — L72.9 SKIN CYST: ICD-10-CM

## 2023-03-09 DIAGNOSIS — Z01.818 PRE-OP TESTING: Primary | ICD-10-CM

## 2023-03-09 PROCEDURE — 0HBAXZZ EXCISION OF INGUINAL SKIN, EXTERNAL APPROACH: ICD-10-PCS | Performed by: SURGERY

## 2023-03-09 PROCEDURE — 0HB6XZZ EXCISION OF BACK SKIN, EXTERNAL APPROACH: ICD-10-PCS | Performed by: SURGERY

## 2023-03-09 PROCEDURE — 12032 INTMD RPR S/A/T/EXT 2.6-7.5: CPT | Performed by: SURGERY

## 2023-03-09 PROCEDURE — 11402 EXC TR-EXT B9+MARG 1.1-2 CM: CPT | Performed by: SURGERY

## 2023-03-09 RX ORDER — BUPIVACAINE HYDROCHLORIDE AND EPINEPHRINE 5; 5 MG/ML; UG/ML
INJECTION, SOLUTION PERINEURAL AS NEEDED
Status: DISCONTINUED | OUTPATIENT
Start: 2023-03-09 | End: 2023-03-09 | Stop reason: HOSPADM

## 2023-03-09 NOTE — INTERVAL H&P NOTE
Pre-op Diagnosis: Skin cyst [L72.9]    The above referenced H&P was reviewed by Vani Moon MD on 3/9/2023, the patient was examined and no significant changes have occurred in the patient's condition since the H&P was performed. I discussed with the patient and/or legal representative the potential benefits, risks and side effects of this procedure; the likelihood of the patient achieving goals; and potential problems that might occur during recuperation. I discussed reasonable alternatives to the procedure, including risks, benefits and side effects related to the alternatives and risks related to not receiving this procedure. We will proceed with procedure as planned.

## 2023-03-09 NOTE — BRIEF OP NOTE
Pre-Operative Diagnosis: Skin cyst [L72.9]     Post-Operative Diagnosis: Skin cyst [L72.9]      Procedure Performed:   Excision biopsy left inguinal skin cyst and left upper back cyst    Surgeon(s) and Role:     Aubrey Halsted, MD - Primary    Assistant(s):        Surgical Findings: same     Specimen: skin lesion x 2     Estimated Blood Loss: No data recorded    Dictation Number:  [id number]    Kimberley Marcos MD  3/9/2023  12:33 PM

## 2023-03-09 NOTE — OPERATIVE REPORT
[facility]    PATIENT'S NAME: Juan David Patel   ATTENDING PHYSICIAN: Aury Dial MD   OPERATING PHYSICIAN: Aury Dial MD   PATIENT ACCOUNT#:   [id number]    LOCATION:  [place] [address]  MEDICAL RECORD #:   [id number]       YOB: 1960  ADMISSION DATE:       03/09/2023      OPERATION DATE:  03/09/2023    OPERATIVE REPORT      PREOPERATIVE DIAGNOSIS:    1. Left upper back skin cyst.  2.   Left inguinal subcutaneous mass. POSTOPERATIVE DIAGNOSIS:    1. Left upper back skin cyst.  2.   Left inguinal subcutaneous mass. PROCEDURE:    1. Excisional biopsy left upper back skin cyst (1.3 cm). 2.   Layered closure (3.2 cm). 3.   Excisional biopsy left inguinal subcutaneous mass (1.2 cm). 4.   Layered closure (3.4 cm). COMPLICATIONS:  None. ESTIMATED BLOOD LOSS:  10 mL. PATHOLOGY SPECIMENS:  Skin cyst and subcutaneous mass. INDICATIONS:  This 71-year-old woman presented with a slowly enlarging subcutaneous mass in the left inguinal area. She also has a nodular probable skin cyst in the left upper back. Excision is indicated in hopes of relieving her mild symptoms and avoiding infection and to rule out a malignancy. OPERATIVE TECHNIQUE:  The patient and I identified the areas of interest together preoperatively. She was taken to the operating room where, in prone position, the left upper back was prepped and draped in the usual aseptic fashion. Skin and subcutaneous tissue were infiltrated with 0.5% Marcaine with epinephrine, and a transverse elliptical skin incision made to include the raised nodular central area of the skin cyst.  This was extended into fairly deep subcutaneous tissue using electrocautery for hemostasis. The lesion was sent to Pathology and flaps developed. The wound was closed in layers with 3-0 and 4-0 Vicryl suture. Dermabond was applied.     The patient was then placed into supine position and the left inguinal area prepped and draped in the usual aseptic fashion. Skin and subcutaneous tissue were infiltrated with 0.5% Marcaine with epinephrine. I made an elliptical skin incision along lines of skin tension in the groin to include most of the skin anterior to this subcutaneous mass and a small dimple on the skin as well. As dissection continued into the subcutaneous tissue, it appeared to be mostly a fatty-appearing tumor. It was excised in its entirety and sent to Pathology. The wound was irrigated and closed in layers with 3-0 and 4-0 Vicryl suture. Dermabond was applied. Overall, the patient tolerated the procedure well. She was taken to the recovery area in stable condition.     Dictated By Tamika Porter MD  d: 03/09/2023 12:39:12  t: 03/09/2023 15:41:27  [place] [id number]  BEM/

## 2023-05-11 ENCOUNTER — OFFICE VISIT (OUTPATIENT)
Dept: FAMILY MEDICINE CLINIC | Facility: CLINIC | Age: 63
End: 2023-05-11

## 2023-05-11 VITALS
SYSTOLIC BLOOD PRESSURE: 151 MMHG | DIASTOLIC BLOOD PRESSURE: 74 MMHG | WEIGHT: 104 LBS | HEART RATE: 66 BPM | BODY MASS INDEX: 16.32 KG/M2 | HEIGHT: 67 IN

## 2023-05-11 DIAGNOSIS — R31.21 ASYMPTOMATIC MICROSCOPIC HEMATURIA: ICD-10-CM

## 2023-05-11 DIAGNOSIS — N94.9 VAGINAL DISCOMFORT: Primary | ICD-10-CM

## 2023-05-11 DIAGNOSIS — R31.1 BENIGN ESSENTIAL MICROSCOPIC HEMATURIA: ICD-10-CM

## 2023-05-11 PROCEDURE — 99213 OFFICE O/P EST LOW 20 MIN: CPT | Performed by: NURSE PRACTITIONER

## 2023-05-13 LAB — TRICHOMONAS SCREEN: NEGATIVE

## 2023-07-18 ENCOUNTER — LAB ENCOUNTER (OUTPATIENT)
Dept: LAB | Age: 63
End: 2023-07-18
Attending: FAMILY MEDICINE
Payer: MEDICAID

## 2023-07-18 ENCOUNTER — OFFICE VISIT (OUTPATIENT)
Dept: FAMILY MEDICINE CLINIC | Facility: CLINIC | Age: 63
End: 2023-07-18

## 2023-07-18 VITALS
BODY MASS INDEX: 17.42 KG/M2 | DIASTOLIC BLOOD PRESSURE: 78 MMHG | WEIGHT: 111 LBS | HEART RATE: 74 BPM | TEMPERATURE: 98 F | SYSTOLIC BLOOD PRESSURE: 128 MMHG | HEIGHT: 67 IN

## 2023-07-18 DIAGNOSIS — E55.9 VITAMIN D DEFICIENCY: ICD-10-CM

## 2023-07-18 DIAGNOSIS — F41.8 DEPRESSION WITH ANXIETY: ICD-10-CM

## 2023-07-18 DIAGNOSIS — E04.1 THYROID NODULE: ICD-10-CM

## 2023-07-18 DIAGNOSIS — K58.2 IRRITABLE BOWEL SYNDROME WITH BOTH CONSTIPATION AND DIARRHEA: ICD-10-CM

## 2023-07-18 DIAGNOSIS — R14.2 FLATULENCE, ERUCTATION AND GAS PAIN: ICD-10-CM

## 2023-07-18 DIAGNOSIS — Z00.00 ADULT GENERAL MEDICAL EXAM: Primary | ICD-10-CM

## 2023-07-18 DIAGNOSIS — R14.1 FLATULENCE, ERUCTATION AND GAS PAIN: ICD-10-CM

## 2023-07-18 DIAGNOSIS — R10.11 RUQ ABDOMINAL PAIN: ICD-10-CM

## 2023-07-18 DIAGNOSIS — R63.0 DECREASED APPETITE: ICD-10-CM

## 2023-07-18 DIAGNOSIS — R14.3 FLATULENCE, ERUCTATION AND GAS PAIN: ICD-10-CM

## 2023-07-18 DIAGNOSIS — Z23 NEED FOR VACCINATION: ICD-10-CM

## 2023-07-18 DIAGNOSIS — F41.9 ANXIETY: ICD-10-CM

## 2023-07-18 DIAGNOSIS — F51.01 PRIMARY INSOMNIA: ICD-10-CM

## 2023-07-18 DIAGNOSIS — R63.6 UNDERWEIGHT: ICD-10-CM

## 2023-07-18 DIAGNOSIS — Z00.00 ADULT GENERAL MEDICAL EXAM: ICD-10-CM

## 2023-07-18 LAB
ALBUMIN SERPL-MCNC: 4.2 G/DL (ref 3.4–5)
ALBUMIN/GLOB SERPL: 1.3 {RATIO} (ref 1–2)
ALP LIVER SERPL-CCNC: 69 U/L
ALT SERPL-CCNC: 29 U/L
ANION GAP SERPL CALC-SCNC: 7 MMOL/L (ref 0–18)
AST SERPL-CCNC: 18 U/L (ref 15–37)
BASOPHILS # BLD AUTO: 0.04 X10(3) UL (ref 0–0.2)
BASOPHILS NFR BLD AUTO: 0.9 %
BILIRUB SERPL-MCNC: 0.6 MG/DL (ref 0.1–2)
BUN BLD-MCNC: 15 MG/DL (ref 7–18)
CALCIUM BLD-MCNC: 9.7 MG/DL (ref 8.5–10.1)
CHLORIDE SERPL-SCNC: 108 MMOL/L (ref 98–112)
CHOLEST SERPL-MCNC: 172 MG/DL (ref ?–200)
CO2 SERPL-SCNC: 25 MMOL/L (ref 21–32)
CREAT BLD-MCNC: 0.69 MG/DL
EOSINOPHIL # BLD AUTO: 0.14 X10(3) UL (ref 0–0.7)
EOSINOPHIL NFR BLD AUTO: 3 %
ERYTHROCYTE [DISTWIDTH] IN BLOOD BY AUTOMATED COUNT: 12.3 %
FASTING PATIENT LIPID ANSWER: YES
FASTING STATUS PATIENT QL REPORTED: YES
GFR SERPLBLD BASED ON 1.73 SQ M-ARVRAT: 99 ML/MIN/1.73M2 (ref 60–?)
GLOBULIN PLAS-MCNC: 3.3 G/DL (ref 2.8–4.4)
GLUCOSE BLD-MCNC: 101 MG/DL (ref 70–99)
HCT VFR BLD AUTO: 37 %
HDLC SERPL-MCNC: 62 MG/DL (ref 40–59)
HGB BLD-MCNC: 12 G/DL
IMM GRANULOCYTES # BLD AUTO: 0.02 X10(3) UL (ref 0–1)
IMM GRANULOCYTES NFR BLD: 0.4 %
LDLC SERPL CALC-MCNC: 94 MG/DL (ref ?–100)
LYMPHOCYTES # BLD AUTO: 1.33 X10(3) UL (ref 1–4)
LYMPHOCYTES NFR BLD AUTO: 28.9 %
MCH RBC QN AUTO: 29.4 PG (ref 26–34)
MCHC RBC AUTO-ENTMCNC: 32.4 G/DL (ref 31–37)
MCV RBC AUTO: 90.7 FL
MONOCYTES # BLD AUTO: 0.37 X10(3) UL (ref 0.1–1)
MONOCYTES NFR BLD AUTO: 8 %
NEUTROPHILS # BLD AUTO: 2.71 X10 (3) UL (ref 1.5–7.7)
NEUTROPHILS # BLD AUTO: 2.71 X10(3) UL (ref 1.5–7.7)
NEUTROPHILS NFR BLD AUTO: 58.8 %
NONHDLC SERPL-MCNC: 110 MG/DL (ref ?–130)
OSMOLALITY SERPL CALC.SUM OF ELEC: 291 MOSM/KG (ref 275–295)
PLATELET # BLD AUTO: 145 10(3)UL (ref 150–450)
POTASSIUM SERPL-SCNC: 5 MMOL/L (ref 3.5–5.1)
PROT SERPL-MCNC: 7.5 G/DL (ref 6.4–8.2)
RBC # BLD AUTO: 4.08 X10(6)UL
SODIUM SERPL-SCNC: 140 MMOL/L (ref 136–145)
TRIGL SERPL-MCNC: 84 MG/DL (ref 30–149)
TSI SER-ACNC: 3.66 MIU/ML (ref 0.36–3.74)
VIT D+METAB SERPL-MCNC: 66 NG/ML (ref 30–100)
VLDLC SERPL CALC-MCNC: 14 MG/DL (ref 0–30)
WBC # BLD AUTO: 4.6 X10(3) UL (ref 4–11)

## 2023-07-18 PROCEDURE — 80053 COMPREHEN METABOLIC PANEL: CPT

## 2023-07-18 PROCEDURE — 90471 IMMUNIZATION ADMIN: CPT | Performed by: FAMILY MEDICINE

## 2023-07-18 PROCEDURE — 36415 COLL VENOUS BLD VENIPUNCTURE: CPT

## 2023-07-18 PROCEDURE — 3008F BODY MASS INDEX DOCD: CPT | Performed by: FAMILY MEDICINE

## 2023-07-18 PROCEDURE — 99214 OFFICE O/P EST MOD 30 MIN: CPT | Performed by: FAMILY MEDICINE

## 2023-07-18 PROCEDURE — 80061 LIPID PANEL: CPT

## 2023-07-18 PROCEDURE — 84443 ASSAY THYROID STIM HORMONE: CPT

## 2023-07-18 PROCEDURE — 82306 VITAMIN D 25 HYDROXY: CPT

## 2023-07-18 PROCEDURE — 99396 PREV VISIT EST AGE 40-64: CPT | Performed by: FAMILY MEDICINE

## 2023-07-18 PROCEDURE — 85025 COMPLETE CBC W/AUTO DIFF WBC: CPT

## 2023-07-18 PROCEDURE — 90677 PCV20 VACCINE IM: CPT | Performed by: FAMILY MEDICINE

## 2023-07-18 PROCEDURE — 90472 IMMUNIZATION ADMIN EACH ADD: CPT | Performed by: FAMILY MEDICINE

## 2023-07-18 PROCEDURE — 90750 HZV VACC RECOMBINANT IM: CPT | Performed by: FAMILY MEDICINE

## 2023-07-18 PROCEDURE — 3074F SYST BP LT 130 MM HG: CPT | Performed by: FAMILY MEDICINE

## 2023-07-18 PROCEDURE — 3078F DIAST BP <80 MM HG: CPT | Performed by: FAMILY MEDICINE

## 2023-07-18 RX ORDER — PAROXETINE 10 MG/1
10 TABLET, FILM COATED ORAL EVERY MORNING
Qty: 90 TABLET | Refills: 1 | Status: SHIPPED | OUTPATIENT
Start: 2023-07-18

## 2023-07-19 ENCOUNTER — TELEPHONE (OUTPATIENT)
Dept: FAMILY MEDICINE CLINIC | Facility: CLINIC | Age: 63
End: 2023-07-19

## 2023-07-19 DIAGNOSIS — K76.9 LIVER LESION: ICD-10-CM

## 2023-07-19 DIAGNOSIS — K76.9 HEPATOPATHY: Primary | ICD-10-CM

## 2023-07-19 DIAGNOSIS — R22.1 MASS OF THYROID REGION: ICD-10-CM

## 2023-07-19 DIAGNOSIS — Z85.3 HX OF BREAST CANCER: ICD-10-CM

## 2023-07-19 NOTE — TELEPHONE ENCOUNTER
Nandini from [facility] is requesting the MRI of the Abdomen order be faxed to    [facility] # [phone]    Any questions, please

## 2023-07-20 NOTE — TELEPHONE ENCOUNTER
MRI Abdomen order is . Per visit yesterday:  MRI for her abdomen in the past; I discussed that she already has an MRI order per Dr. Melani Gudino for MRI of the abdomen that she has not done and I will reprint the order. Pended for review if okay to reorder.

## 2023-08-22 ENCOUNTER — HOSPITAL ENCOUNTER (OUTPATIENT)
Dept: NUTRITION | Facility: HOSPITAL | Age: 63
Discharge: HOME | End: 2023-08-22
Attending: FAMILY MEDICINE
Payer: MEDICAID

## 2023-09-05 ENCOUNTER — HOSPITAL ENCOUNTER (OUTPATIENT)
Dept: MAMMOGRAPHY | Facility: HOSPITAL | Age: 63
Discharge: HOME | End: 2023-09-05
Attending: OBSTETRICS & GYNECOLOGY
Payer: MEDICAID

## 2023-09-05 DIAGNOSIS — Z12.31 ENCOUNTER FOR SCREENING MAMMOGRAM FOR BREAST CANCER: ICD-10-CM

## 2023-09-05 PROCEDURE — 77067 SCR MAMMO BI INCL CAD: CPT | Performed by: OBSTETRICS & GYNECOLOGY

## 2023-09-05 PROCEDURE — 77063 BREAST TOMOSYNTHESIS BI: CPT | Performed by: OBSTETRICS & GYNECOLOGY

## 2024-02-16 ENCOUNTER — OFFICE VISIT (OUTPATIENT)
Dept: FAMILY MEDICINE CLINIC | Facility: CLINIC | Age: 64
End: 2024-02-16

## 2024-02-16 VITALS
SYSTOLIC BLOOD PRESSURE: 121 MMHG | DIASTOLIC BLOOD PRESSURE: 80 MMHG | HEIGHT: 67 IN | WEIGHT: 116 LBS | HEART RATE: 85 BPM | BODY MASS INDEX: 18.21 KG/M2

## 2024-02-16 DIAGNOSIS — K64.1 GRADE II HEMORRHOIDS: ICD-10-CM

## 2024-02-16 DIAGNOSIS — R10.2 VAGINAL PAIN: ICD-10-CM

## 2024-02-16 DIAGNOSIS — R31.9 HEMATURIA, UNSPECIFIED TYPE: Primary | ICD-10-CM

## 2024-02-16 DIAGNOSIS — R35.89 POLYURIA: ICD-10-CM

## 2024-02-16 DIAGNOSIS — R10.2 PELVIC PAIN: ICD-10-CM

## 2024-02-16 LAB
APPEARANCE: CLEAR
BILIRUBIN: NEGATIVE
GLUCOSE (URINE DIPSTICK): NEGATIVE MG/DL
KETONES (URINE DIPSTICK): NEGATIVE MG/DL
LEUKOCYTES: NEGATIVE
MULTISTIX LOT#: ABNORMAL NUMERIC
NITRITE, URINE: NEGATIVE
PH, URINE: 6.5 (ref 4.5–8)
PROTEIN (URINE DIPSTICK): NEGATIVE MG/DL
SPECIFIC GRAVITY: 1.02 (ref 1–1.03)
URINE-COLOR: YELLOW
UROBILINOGEN,SEMI-QN: 0.2 MG/DL (ref 0–1.9)

## 2024-02-16 PROCEDURE — 99214 OFFICE O/P EST MOD 30 MIN: CPT | Performed by: FAMILY MEDICINE

## 2024-02-16 PROCEDURE — 81003 URINALYSIS AUTO W/O SCOPE: CPT | Performed by: FAMILY MEDICINE

## 2024-02-16 RX ORDER — HYDROCORTISONE 25 MG/G
1 CREAM TOPICAL 2 TIMES DAILY
Qty: 28 G | Refills: 1 | Status: SHIPPED | OUTPATIENT
Start: 2024-02-16

## 2024-02-16 NOTE — PROGRESS NOTES
2024  8:54 AM    Christine Gabriel is a 63 year old female.    Chief complaint(s):   Chief Complaint   Patient presents with    Vaginal Problem     HPI:     Christine Gabriel primary complaint is regarding multiple complaints.     Patient is a 64-year-old female who presents complaining of vaginal itchiness, burning and dryness.  Also complaining of pain on her left adnexa, left lower quadrant pain.  Also complaining of rectal pain and burning sensation.  Had a normal Pap smear less than a year ago.  This was normal.  Also complaining of polyuria and dysuria.  However the dysuria has resolved.  No history of fever, +nausea  and vomiting but no diarrhea.    HISTORY:  Past Medical History:   Diagnosis Date    Basal cell carcinoma (BCC) of skin of left lower eyelid including canthus     Breast CA (HCC)     left lumpectomy    Breast cancer, left (HCC) 2010    lumpectomy, SLNB, radiation, tamoxifen, Dr. Mark Reyes, [facility], [facility].\"    Colon polyps     Constipation     Exposure to medical therapeutic radiation     L chest    Iron deficiency anemia     Liver cyst     Liver hemangioma 06/10/2022    Menorrhagia 2010    endocervical curettage    Pregnancy     , ,     SAB (spontaneous ) 2006    Thyroid nodule       Past Surgical History:   Procedure Laterality Date    ASPIR/INJ THYROID CYST Right 2022    BREAST LUMPECTOMY Left 2010    CHEMOSURG MOHS 1ST STAGE Left     COLONOSCOPY      COLONOSCOPY N/A 2022    Procedure: COLONOSCOPY;  Surgeon: Yi Bourgeois MD;  Location: [facility] ENDOSCOPY    ENDOCERVICAL CURETTAGE  2010    EXCISION OF LESION, EYELID - OS - LEFT EYE Left 2014    BCCa    HYSTEROSCOPY  2010    Per [facility]:  \"hysteroscopy - D&C.\"    LUMPECTOMY LEFT            ,     RADIATION LEFT          SENT LYMPH NODE BIOPSY Left 2010      Family History   Problem Relation Age of  Onset    Breast Cancer Self     Heart Disease Mother 88    Other (accidental death) Father 66    Breast Cancer Sister 58    Cancer Sister         Family h/o Thyroid Cancer    Cancer Brother         Family h/o Liver Cancer    Other (oral cancer) Brother     Other (alive and well) Son         x2    Breast Cancer Maternal Cousin Female     Breast Cancer Maternal Cousin Female     Breast Cancer Paternal Cousin Female     Breast Cancer Other       Social History:   Social History     Socioeconomic History    Marital status:     Number of children: 2   Occupational History    Occupation: homemaker   Tobacco Use    Smoking status: Never    Smokeless tobacco: Never   Vaping Use    Vaping Use: Never used   Substance and Sexual Activity    Alcohol use: Yes     Alcohol/week: 0.0 standard drinks of alcohol     Comment: seldom    Drug use: Never   Other Topics Concern    Caffeine Concern No    Occupational Exposure No    Pt has a pacemaker No    Pt has a defibrillator No    Reaction to local anesthetic No   Social History Narrative    ** Merged History Encounter **             Immunizations:   Immunization History   Administered Date(s) Administered    Covid-19 Vaccine Pfizer 30 mcg/0.3 ml 03/24/2021, 04/21/2021    Pneumococcal Conjugate PCV20 07/18/2023    TDAP 12/04/2013, 07/12/2022    Zoster Vaccine Recombinant Adjuvanted (Shingrix) 09/17/2022, 07/18/2023       Medications (Active prior to today's visit):  Current Outpatient Medications   Medication Sig Dispense Refill    hydrocortisone (ANUSOL-HC) 2.5 % External Cream Place 1 Application rectally 2 (two) times daily. 28 g 1    PARoxetine (PAXIL) 10 MG Oral Tab Take 1 tablet (10 mg total) by mouth every morning. For mood 90 tablet 1    ergocalciferol 1.25 MG (06021 UT) Oral Cap Take 1 capsule (50,000 Units total) by mouth once a week.      Multiple Vitamins-Minerals (MULTIVITAMIN ADULTS) Oral Tab Take 1 tablet by mouth daily. 100 tablet 2       Allergies:  Allergies    Allergen Reactions    Latex ITCHING         ROS:   Review of Systems   Constitutional:  Negative for appetite change and fever.   Eyes:  Negative for visual disturbance.   Respiratory:  Negative for shortness of breath.    Cardiovascular:  Negative for chest pain.   Gastrointestinal:  Positive for nausea and vomiting. Negative for diarrhea. Abdominal pain: LLQ.  Genitourinary:  Positive for vaginal pain. Negative for vaginal bleeding.   Musculoskeletal:  Negative for back pain.   Skin:  Negative for rash.   Neurological:  Negative for dizziness and headaches.       PHYSICAL EXAM:   VS: /80   Pulse 85   Ht 5' 7\" (1.702 m)   Wt 116 lb (52.6 kg)   BMI 18.17 kg/m²     Physical Exam  Vitals reviewed. Exam conducted with a chaperone present.   Constitutional:       General: She is not in acute distress.     Appearance: Normal appearance.   HENT:      Head: Normocephalic.   Eyes:      Conjunctiva/sclera: Conjunctivae normal.   Cardiovascular:      Rate and Rhythm: Normal rate.   Pulmonary:      Effort: Pulmonary effort is normal.   Genitourinary:     Labia:         Right: No rash.         Left: No rash.       Vagina: No vaginal discharge or lesions.      Cervix: No cervical motion tenderness or discharge.      Adnexa:         Right: No mass or tenderness.          Left: No mass or tenderness.        Rectum: External hemorrhoid present.   Musculoskeletal:      Cervical back: Neck supple.   Skin:     Findings: No rash.   Psychiatric:         Mood and Affect: Mood normal.         LABORATORY RESULTS:   No results found for: \"URCOLOR\", \"URCLA\", \"URINELEUK\", \"URINENITRITE\", \"URINEBLOOD\"   Results for orders placed or performed in visit on 02/16/24   URINALYSIS, AUTO, W/O SCOPE   Result Value Ref Range    Glucose Urine Negative Negative mg/dL    Bilirubin Urine Negative Negative    Ketones, UA Negative Negative - Trace mg/dL    Spec Gravity 1.020 1.005 - 1.030    Blood Urine Moderate (A) Negative    PH Urine 6.5 5.0 -  8.0    Protein Urine Negative Negative - Trace mg/dL    Urobilinogen Urine 0.2 0.2 - 1.0 mg/dL    Nitrite Urine Negative Negative    Leukocyte Esterase Urine Negative Negative    APPEARANCE clear Clear    Color Urine yellow Yellow    Mult[employer]ix Lot# [id number] Numeric    [employer] Expiration Date 12/24/2025 Date       EKG / Spirometry : -     Radiology: No results found.     ASSESSMENT/PLAN:   Assessment   Encounter Diagnoses   Name Primary?    Vaginal pain     Pelvic pain     Grade II hemorrhoids     Polyuria     Hematuria, unspecified type Yes       MEDICATIONS:     Requested Prescriptions     Signed Prescriptions Disp Refills    hydrocortisone (ANUSOL-HC) 2.5 % External Cream 28 g 1     Sig: Place 1 Application rectally 2 (two) times daily.          LABORATORY & ORDERS:   Orders Placed This Encounter   Procedures    URINALYSIS, AUTO, W/O SCOPE    Urine Culture, Routine    Vaginitis Vaginosis PCR Panel     REFERRALS: US ABDOMEN COMPLETE (CPT=76700),       Procedures        US ABDOMEN COMPLETE (CPT=76700)        RECOMMENDATIONS given include: Patient was reassured of  her medical condition and all questions and concerns were answered. Patient was informed to please, call our office with any new or further questions or concerns that may come up in the near future. Notify Dr Morgan or the [facility] if there is a deterioration or worsening of the medical condition. Also, inform the doctor with any new symptoms or medications' side effects.      FOLLOW-UP: Schedule a follow-up visit in 2 weeks .            Orders This Visit:  Orders Placed This Encounter   Procedures    URINALYSIS, AUTO, W/O SCOPE    Urine Culture, Routine    Vaginitis Vaginosis PCR Panel       Meds This Visit:  Requested Prescriptions     Signed Prescriptions Disp Refills    hydrocortisone (ANUSOL-HC) 2.5 % External Cream 28 g 1     Sig: Place 1 Application rectally 2 (two) times daily.       Imaging & Referrals:  US ABDOMEN COMPLETE (CPT=76700)          MILES GARAY MD

## 2024-02-17 LAB
BV BACTERIA DNA VAG QL NAA+PROBE: NEGATIVE
C GLABRATA DNA VAG QL NAA+PROBE: NEGATIVE
C KRUSEI DNA VAG QL NAA+PROBE: NEGATIVE
CANDIDA DNA VAG QL NAA+PROBE: NEGATIVE
T VAGINALIS DNA VAG QL NAA+PROBE: NEGATIVE

## 2024-04-26 ENCOUNTER — OFFICE VISIT (OUTPATIENT)
Dept: FAMILY MEDICINE CLINIC | Facility: CLINIC | Age: 64
End: 2024-04-26

## 2024-04-26 ENCOUNTER — LAB ENCOUNTER (OUTPATIENT)
Dept: LAB | Age: 64
End: 2024-04-26
Attending: FAMILY MEDICINE
Payer: MEDICAID

## 2024-04-26 VITALS
DIASTOLIC BLOOD PRESSURE: 64 MMHG | WEIGHT: 111.63 LBS | BODY MASS INDEX: 17.52 KG/M2 | HEART RATE: 74 BPM | SYSTOLIC BLOOD PRESSURE: 135 MMHG | HEIGHT: 67 IN

## 2024-04-26 DIAGNOSIS — R22.1 MASS OF THYROID REGION: ICD-10-CM

## 2024-04-26 DIAGNOSIS — S83.004A DISLOCATION OF RIGHT PATELLA, INITIAL ENCOUNTER: Primary | ICD-10-CM

## 2024-04-26 DIAGNOSIS — E55.9 VITAMIN D DEFICIENCY: ICD-10-CM

## 2024-04-26 DIAGNOSIS — F51.01 PRIMARY INSOMNIA: ICD-10-CM

## 2024-04-26 DIAGNOSIS — E07.9 THYROID MASS: ICD-10-CM

## 2024-04-26 DIAGNOSIS — Z86.2 HISTORY OF IRON DEFICIENCY ANEMIA: ICD-10-CM

## 2024-04-26 DIAGNOSIS — Z85.3 HX OF BREAST CANCER: ICD-10-CM

## 2024-04-26 LAB
DEPRECATED HBV CORE AB SER IA-ACNC: 77.5 NG/ML
TSI SER-ACNC: 3.82 MIU/ML (ref 0.55–4.78)
VIT D+METAB SERPL-MCNC: 31.4 NG/ML (ref 30–100)

## 2024-04-26 PROCEDURE — 82306 VITAMIN D 25 HYDROXY: CPT

## 2024-04-26 PROCEDURE — 99213 OFFICE O/P EST LOW 20 MIN: CPT | Performed by: FAMILY MEDICINE

## 2024-04-26 PROCEDURE — 36415 COLL VENOUS BLD VENIPUNCTURE: CPT

## 2024-04-26 PROCEDURE — 84443 ASSAY THYROID STIM HORMONE: CPT

## 2024-04-26 PROCEDURE — 82728 ASSAY OF FERRITIN: CPT

## 2024-04-26 NOTE — PROGRESS NOTES
2024  1:16 PM    Christine Gabriel is a 63 year old female.    Chief complaint(s):   Chief Complaint   Patient presents with    Test Results     Vitamin D, Hemoglobin     Knee Pain     Right knee X2 weeks ago fell      HPI:     Christine Gabriel primary complaint is regarding multiple compliants.     Patient is a 64-year-old female with long history of anemia as well as history of breast cancer and thyroid nodules who presents requesting testing for all of this.  Last time he was referred for a ultrasound of the thyroid failed her appointment but she agreed to call and reschedule that.    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  Last time it happened was a week ago and was in severe pain.    Furthermore she is requesting a blood test to check her vitamin D level since she has a history of low vitamin D..      HISTORY:  Past Medical History:    Basal cell carcinoma (BCC) of skin of left lower eyelid including canthus    Breast CA (HCC)    left lumpectomy    Breast cancer, left (HCC)    lumpectomy, SLNB, radiation, tamoxifen, Dr. Mark Reyes, [facility], [facility].\"    Colon polyps    Constipation    Exposure to medical therapeutic radiation    L chest    Iron deficiency anemia    Liver cyst    Liver hemangioma    Menorrhagia    endocervical curettage    Pregnancy (HCC)    , ,     SAB (spontaneous ) (HCC)    Thyroid nodule      Past Surgical History:   Procedure Laterality Date    Aspir/inj thyroid cyst Right 2022    Breast lumpectomy Left 2010    [place] mohs 1st stage Left     Colonoscopy      Colonoscopy N/A 2022    Procedure: COLONOSCOPY;  Surgeon: Yi Bourgeois MD;  Location: [facility] ENDOSCOPY    Endocervical curettage  2010    Excision of lesion, eyelid - os - left eye Left 2014    BCCa    Hysteroscopy  2010    Per  [facility]:  \"hysteroscopy - D&C.\"    Lumpectomy left      2010      ,     Radiation left          Sent lymph node biopsy Left 2010      Family History   Problem Relation Age of Onset    Breast Cancer Self     Heart Disease Mother 88    Other (accidental death) Father 66    Breast Cancer Sister 58    Cancer Sister         Family h/o Thyroid Cancer    Cancer Brother         Family h/o Liver Cancer    Other (oral cancer) Brother     Other (alive and well) Son         x2    Breast Cancer Maternal Cousin Female     Breast Cancer Maternal Cousin Female     Breast Cancer Paternal Cousin Female     Breast Cancer Other       Social History:   Social History     Socioeconomic History    Marital status:     Number of children: 2   Occupational History    Occupation: homemaker   Tobacco Use    Smoking status: Never    Smokeless tobacco: Never   Vaping Use    Vaping status: Never Used   Substance and Sexual Activity    Alcohol use: Yes     Alcohol/week: 0.0 standard drinks of alcohol     Comment: seldom    Drug use: Never   Other Topics Concern    Caffeine Concern No    Occupational Exposure No    Pt has a pacemaker No    Pt has a defibrillator No    Reaction to local anesthetic No   Social History Narrative    ** Merged History Encounter **             Immunizations:   Immunization History   Administered Date(s) Administered    Covid-19 Vaccine Pfizer 30 mcg/0.3 ml 2021, 2021    Pneumococcal Conjugate PCV20 2023    TDAP 2013, 2022    Zoster Vaccine Recombinant Adjuvanted (Shingrix) 2022, 2023       Medications (Active prior to today's visit):  Current Outpatient Medications   Medication Sig Dispense Refill    temazepam 30 MG Oral Cap Take 1 capsule (30 mg total) by mouth nightly as needed for Sleep. (Patient not taking: Reported on 2024) 90 capsule 1    hydrocortisone (ANUSOL-HC) 2.5 % External Cream Place 1 Application rectally 2 (two) times daily.  (Patient not taking: Reported on 3/1/2024) 28 g 1    PARoxetine (PAXIL) 10 MG Oral Tab Take 1 tablet (10 mg total) by mouth every morning. For mood (Patient not taking: Reported on 3/1/2024) 90 tablet 1    ergocalciferol 1.25 MG (40300 UT) Oral Cap Take 1 capsule (50,000 Units total) by mouth once a week. (Patient not taking: Reported on 3/1/2024)      Multiple Vitamins-Minerals (MULTIVITAMIN ADULTS) Oral Tab Take 1 tablet by mouth daily. (Patient not taking: Reported on 4/26/2024) 100 tablet 2       Allergies:  Allergies   Allergen Reactions    Latex ITCHING         ROS:   Review of Systems   Constitutional:  Negative for appetite change and fever.   Eyes:  Negative for visual disturbance.   Respiratory:  Negative for shortness of breath.    Cardiovascular:  Negative for chest pain.   Gastrointestinal:  Negative for abdominal pain, nausea and vomiting.   Musculoskeletal:  Negative for back pain.        Knee pain   Skin:  Negative for rash.   Neurological:  Negative for dizziness and headaches.       PHYSICAL EXAM:   VS: /64   Pulse 74   Ht 5' 7\" (1.702 m)   Wt 111 lb 9.6 oz (50.6 kg)   BMI 17.48 kg/m²     Physical Exam  Vitals reviewed.   Constitutional:       General: She is not in acute distress.     Appearance: Normal appearance.   HENT:      Head: Normocephalic.   Eyes:      Conjunctiva/sclera: Conjunctivae normal.   Cardiovascular:      Rate and Rhythm: Normal rate.   Pulmonary:      Effort: Pulmonary effort is normal.   Musculoskeletal:      Cervical back: Neck supple.      Comments: Right knee NFROM, NT, no swelling   Skin:     Findings: No rash.   Psychiatric:         Mood and Affect: Mood normal.         LABORATORY RESULTS:     EKG / Spirometry : -     Radiology: No results found.     ASSESSMENT/PLAN:   Assessment   Encounter Diagnoses   Name Primary?    Dislocation of right patella, initial encounter Yes    Vitamin D deficiency     Mass of thyroid region     Hx of breast cancer     History of  iron deficiency anemia        MEDICATIONS:   CPM          LABORATORY & ORDERS:   Orders Placed This Encounter   Procedures    Vitamin D    Ferritin [E]     REFERRALS: ORTHOPEDIC - INTERNAL,       Procedures    Vitamin D    Ferritin [E]    ORTHOPEDIC - INTERNAL        RECOMMENDATIONS given include: Patient was reassured of  her medical condition and all questions and concerns were answered. Patient was informed to please, call our office with any new or further questions or concerns that may come up in the near future. Notify Dr Garay or the [facility] if there is a deterioration or worsening of the medical condition. Also, inform the doctor with any new symptoms or medications' side effects.  Iron rich diet. [place] with radiology for Thyroid Us  FOLLOW-UP: Schedule a follow-up visit in  [place] with PCP.            Orders This Visit:  Orders Placed This Encounter   Procedures    Vitamin D    Ferritin [E]       Meds This Visit:  Requested Prescriptions      No prescriptions requested or ordered in this encounter       Imaging & Referrals:  ORTHOPEDIC - INTERNAL         MILES GARAY MD

## 2024-05-13 ENCOUNTER — HOSPITAL ENCOUNTER (OUTPATIENT)
Dept: ULTRASOUND IMAGING | Facility: HOSPITAL | Age: 64
Discharge: HOME | End: 2024-05-13
Attending: FAMILY MEDICINE

## 2024-05-13 DIAGNOSIS — R31.9 HEMATURIA, UNSPECIFIED TYPE: ICD-10-CM

## 2024-05-13 DIAGNOSIS — R10.2 PELVIC PAIN: ICD-10-CM

## 2024-05-13 DIAGNOSIS — R22.1 MASS OF THYROID REGION: Primary | ICD-10-CM

## 2024-05-13 DIAGNOSIS — F51.01 PRIMARY INSOMNIA: ICD-10-CM

## 2024-05-13 DIAGNOSIS — E07.9 THYROID MASS: ICD-10-CM

## 2024-05-13 PROCEDURE — 76700 US EXAM ABDOM COMPLETE: CPT | Performed by: FAMILY MEDICINE

## 2024-05-13 PROCEDURE — 76536 US EXAM OF HEAD AND NECK: CPT | Performed by: FAMILY MEDICINE

## 2024-05-14 DIAGNOSIS — R22.1 MASS OF THYROID REGION: Primary | ICD-10-CM

## 2024-05-21 ENCOUNTER — OFFICE VISIT (OUTPATIENT)
Dept: FAMILY MEDICINE CLINIC | Facility: CLINIC | Age: 64
End: 2024-05-21

## 2024-05-21 VITALS
WEIGHT: 108.63 LBS | BODY MASS INDEX: 17.05 KG/M2 | DIASTOLIC BLOOD PRESSURE: 67 MMHG | SYSTOLIC BLOOD PRESSURE: 128 MMHG | HEART RATE: 81 BPM | HEIGHT: 67 IN

## 2024-05-21 DIAGNOSIS — E04.1 COLLOID THYROID NODULE: ICD-10-CM

## 2024-05-21 DIAGNOSIS — K82.4 GALLBLADDER POLYP: ICD-10-CM

## 2024-05-21 PROCEDURE — 99213 OFFICE O/P EST LOW 20 MIN: CPT | Performed by: FAMILY MEDICINE

## 2024-05-21 NOTE — PROGRESS NOTES
2024  1:30 PM    Christine Gabriel is a 63 year old female.    Chief complaint(s):   Chief Complaint   Patient presents with    Test Results     Discuss abdomen and thyroid ultrasounds     HPI:     Christine Gabriel primary complaint is regarding thyroid nodule.     Patient is a 63-year-old female who is following up after a thyroid ultrasound due to enlargement of the thyroid.  Came back with a large solid nodule on the right side of the thyroid.  Patient was given a referral for a fine-needle aspiration but has not scheduled yet. Thyroid function tests were normal.    Thyroid ultrasound Impression   CONCLUSION:  1. Large solid 3.5 cm TR-3 nodule in the right lobe has significantly increased in size since comparison ultrasound/FNA from  (greater than 50% increase in volume of the nodule).  Given increase in size, repeat FNA is recommended.    Alternatively, could consider continued 12 month surveillance imaging.  2. Lesser incidental findings as above.     In addition patient has been complaining of right upper quadrant pain and had a abdominal ultrasound which confirmed to have a liver hemangioma as well as a gallbladder polyp.  Advised her to see a surgeon for this in the near future after evaluation her thyroid nodule.    HISTORY:  Past Medical History:    Basal cell carcinoma (BCC) of skin of left lower eyelid including canthus    Breast CA (HCC)    left lumpectomy    Breast cancer, left (HCC)    lumpectomy, SLNB, radiation, tamoxifen, Dr. Mark Reyes, [facility], [facility].\"    Colon polyps    Constipation    Exposure to medical therapeutic radiation    L chest    Iron deficiency anemia    Liver cyst    Liver hemangioma    Menorrhagia    endocervical curettage    Pregnancy (HCC)    , ,     SAB (spontaneous ) (HCC)    Thyroid nodule      Past Surgical History:   Procedure Laterality Date    Aspir/inj thyroid cyst Right 2022     Breast lumpectomy Left 2010    Chemosurg mohs 1st stage Left     Colonoscopy      Colonoscopy N/A 2022    Procedure: COLONOSCOPY;  Surgeon: Yi Bourgeois MD;  Location: [facility] ENDOSCOPY    Endocervical curettage  2010    Excision of lesion, eyelid - os - left eye Left 2014    BCCa    Hysteroscopy  2010    Per [facility]:  \"hysteroscopy - D&C.\"    Lumpectomy left            ,     Radiation left          Sent lymph node biopsy Left 2010      Family History   Problem Relation Age of Onset    Breast Cancer Self     Heart Disease Mother 88    Other (accidental death) Father 66    Breast Cancer Sister 58    Cancer Sister         Family h/o Thyroid Cancer    Cancer Brother         Family h/o Liver Cancer    Other (oral cancer) Brother     Other (alive and well) Son         x2    Breast Cancer Maternal Cousin Female     Breast Cancer Maternal Cousin Female     Breast Cancer Paternal Cousin Female     Breast Cancer Other       Social History:   Social History     Socioeconomic History    Marital status:     Number of children: 2   Occupational History    Occupation: homemaker   Tobacco Use    Smoking status: Never    Smokeless tobacco: Never   Vaping Use    Vaping status: Never Used   Substance and Sexual Activity    Alcohol use: Yes     Alcohol/week: 0.0 standard drinks of alcohol     Comment: seldom    Drug use: Never   Other Topics Concern    Caffeine Concern No    Occupational Exposure No    Pt has a pacemaker No    Pt has a defibrillator No    Reaction to local anesthetic No   Social History Narrative    ** Merged History Encounter **             Immunizations:   Immunization History   Administered Date(s) Administered    Covid-19 Vaccine Pfizer 30 mcg/0.3 ml 2021, 2021    Pneumococcal Conjugate PCV20 2023    TDAP 2013, 2022    Zoster Vaccine Recombinant Adjuvanted (Shingrix) 2022, 2023       Medications (Active  prior to today's visit):  Current Outpatient Medications   Medication Sig Dispense Refill    Multiple Vitamins-Minerals (MULTIVITAMIN ADULTS) Oral Tab Take 1 tablet by mouth daily. 100 tablet 2       Allergies:  Allergies   Allergen Reactions    Latex ITCHING         ROS:   Review of Systems   Constitutional:  Negative for appetite change and fever.   Eyes:  Negative for visual disturbance.   Respiratory:  Negative for shortness of breath.    Cardiovascular:  Negative for chest pain.   Gastrointestinal:  Negative for abdominal pain, constipation, diarrhea, nausea and vomiting.   Musculoskeletal:  Negative for back pain.   Skin:  Negative for rash.   Neurological:  Negative for dizziness and headaches.       PHYSICAL EXAM:   VS: /67 (BP Location: Right arm, Patient Position: Sitting, Cuff Size: adult)   Pulse 81   Ht 5' 7\" (1.702 m)   Wt 108 lb 9.6 oz (49.3 kg)   BMI 17.01 kg/m²     Physical Exam  Vitals reviewed.   Constitutional:       General: She is not in acute distress.     Appearance: Normal appearance.   HENT:      Head: Normocephalic.   Eyes:      Conjunctiva/sclera: Conjunctivae normal.   Neck:      Thyroid: Thyroid mass and thyromegaly present. No thyroid tenderness.   Cardiovascular:      Rate and Rhythm: Normal rate.   Pulmonary:      Effort: Pulmonary effort is normal.   Musculoskeletal:      Cervical back: Neck supple.   Skin:     Findings: No rash.   Psychiatric:         Mood and Affect: Mood normal.         LABORATORY RESULTS:   No results found for: \"URCOLOR\", \"URCLA\", \"URINELEUK\", \"URINENITRITE\", \"URINEBLOOD\"   Results for orders placed or performed in visit on 04/26/24   TSH W Reflex To Free T4   Result Value Ref Range    TSH 3.824 0.550 - 4.780 mIU/mL   Ferritin [E]   Result Value Ref Range    Ferritin 77.5 18.0 - 340.0 ng/mL   Vitamin D, 25-Hydroxy   Result Value Ref Range    Vitamin D, 25OH, Total 31.4 30.0 - 100.0 ng/mL       EKG / Spirometry : -     Radiology: US THYROID  (CPT=76536)    Result Date: 5/13/2024  PROCEDURE: US THYROID (CPT= 15942)  COMPARISON: [facility], US FNA THYROID, GUIDE INCLD, FIRST LESION  (CPT=10005), 8/05/2022, 1:33 PM.  INDICATIONS: Thyroid mass  TECHNIQUE: High-resolution ultrasound was performed of the thyroid gland.  FINDINGS:  RIGHT LOBE: 5.2 x 2.9 x 2.9 cm,  21.2 ml.  Homogeneous echogenicity.  Large solid isoechoic to hyperechoic 3.5 x 2.8 x 2.8 cm (volume of 14.3 mL)  right lobe nodule  (TR-3) that previously measured 2.5 x 2.0 x 1.9 cm (volume of 4 9 mL).  Nodule demonstrates an intrinsic 0.5 cm cystic focus.  LEFT LOBE: 3.4 x 1.0 x 0.8 cm,  1.4 ml.  Homogeneous echogenicity.  No suspicious solid or cystic nodule.  ISTHMUS: 0.1-0.2 cm AP diameter in the midline.  Homogeneous echogenicity.  No masses.   OTHER: Scattered nonenlarged cervical lymph nodes, which are likely benign/reactive.         CONCLUSION:  1. Large solid 3.5 cm TR-3 nodule in the right lobe has significantly increased in size since comparison ultrasound/FNA from August, 2022 (greater than 50% increase in volume of the nodule).  Given increase in size, repeat FNA is recommended.  Alternatively, could consider continued 12 month surveillance imaging. 2. Lesser incidental findings as above.   elm-remote  Dictated by (CST): Zach Childs MD on 5/13/2024 at 10:08 AM     Finalized by (CST): Zach Childs MD on 5/13/2024 at 10:13 AM          US ABDOMEN COMPLETE (CPT=76700)    Result Date: 5/13/2024  PROCEDURE: US ABDOMEN COMPLETE (CPT=76700)  COMPARISON: [facility], CT CHEST+ABDOMEN+PELVIS(ALL CNTRST ONLY)(CPT=71260/73451), 4/11/2022, 3:12 PM.  INDICATIONS: Lower abdomen pain  TECHNIQUE:   The abdomen was evaluated with grayscale and colorflow of the main vessels.   FINDINGS:  LIVER: Multiple well-circumscribed ovoid echogenic liver masses are identified.  These include a dominant 4 x 2.6 x 2.6 cm mass in the left hepatic lobe.  Additional 2 x 1.9  x 1.7 cm echogenic mass in the right hepatic lobe.  There is also a small 0.6 cm  echogenic mass in the inferior right hepatic lobe.  Correct directional flow in the main portal and hepatic veins.  Liver demonstrates relatively homogeneous echogenicity.  Liver measures 12.9 cm maximal craniocaudal extent, which is within normal limits. GALLBLADDER: No dilation.  No calculi, wall thickening or pericholecystic fluid.  Probable small dependent 3 mm gallbladder polyp.  Sonographic Pineda's sign was not elicited.  BILE DUCTS: Normal.  Common bile duct measures 6 mm.  PANCREAS: Visualized pancreatic head region demonstrates no gross abnormality. Remaining pancreas obscured by overlying bowel gas. SPLEEN: Spleen measures 9.9 cm in length (within normal limits) and appears homogeneous in echogenicity. KIDNEYS: The right kidney length measures 11.2 cm.  The left kidney length measures 10.4 cm.  No hydronephrosis.  No perinephric fluid collection. AORTA/VASCULAR: No aneurysm.  Patent IVC.  OTHER: Negative.            CONCLUSION:  1. At least 3 well-circumscribed echogenic liver masses, the largest of which measures up to 4 cm in the left hepatic lobe.  These likely correspond to liver lesions on prior April, 2022 abdominal CT.  Although incompletely characterized, benign hemangiomas are favored.  Consider a liver protocol MRI of the abdomen without and with contrast for definitive characterization. 2. Tiny 3 mm dependent polyp in the gallbladder.  Given small size, this is typically considered incidental and requires no additional follow-up. No evidence of cholelithiasis or additional sonographic manifestations of acute cholecystitis. 3. Top-normal caliber 6 mm common duct at the kimberly hepatis. 4. Lesser incidental findings as above.   elm-remote  Dictated by (CST): Zach Childs MD on 5/13/2024 at 10:04 AM     Finalized by (CST): Zach Childs MD on 5/13/2024 at 10:08 AM             ASSESSMENT/PLAN:   Assessment    Encounter Diagnoses   Name Primary?    Colloid thyroid nodule     Gallbladder polyp        1. Colloid thyroid nodule      REFERRALS: Radiology FNB of thyroid       RECOMMENDATIONS given include: Patient was reassured of  her medical condition and all questions and concerns were answered. Patient was informed to please, call our office with any new or further questions or concerns that may come up in the near future. Notify Dr Garay or the [facility] if there is a deterioration or worsening of the medical condition. Also, inform the doctor with any new symptoms or medications' side effects.      FOLLOW-UP: Schedule a follow-up visit after thyroid Bx.       2. Gallbladder polyp    Referral in the near future   - Surgery Referral - In Network          Orders This Visit:  No orders of the defined types were placed in this encounter.      Meds This Visit:  Requested Prescriptions      No prescriptions requested or ordered in this encounter       Imaging & Referrals:  SURGERY - INTERNAL         MILES GARAY MD

## 2024-05-22 ENCOUNTER — TELEPHONE (OUTPATIENT)
Dept: FAMILY MEDICINE CLINIC | Facility: CLINIC | Age: 64
End: 2024-05-22

## 2024-05-22 NOTE — TELEPHONE ENCOUNTER
Spoke to Patient advise to keep appointment. Patient verbalized understanding and had no further questions.

## 2024-05-22 NOTE — TELEPHONE ENCOUNTER
Patient called to advise Ultrasound appointment for Thyroid is scheduled however not until the end of June. Patient will like to know if a sooner appointment can be scheduled by the office, if possible.

## 2024-05-22 NOTE — TELEPHONE ENCOUNTER
Please advise can Patient keep Ultrasound of the Thyroid Appointment scheduled for 6/27/24 or schedule for sooner appointment?.

## 2024-06-21 NOTE — DISCHARGE INSTRUCTIONS
Procedure performed by Dr. SMALLWOOD    Biopsy/Aspiration of THYROID NODULE.    DISCHARGE INSTRUCTIONS    Thyroid Biopsy:  You may develop a sore throat after the procedure.  If necessary,                               throat lozenges may be used to relieve the discomfort.  Call your                               physician immediately if you experience increased swelling in your                                neck, difficulty breathing, or difficulty swallowing.    Additional Discharge Instructions for all Biopsies:                               DO NOT TAKE aspirin-containing products, Ibuprofen, Vitamin E, or                              blood thinning products for three (3) days after the procedure.  You may                             take Tylenol (1 or 2 tablets every 4-6 hours) for mild discomfort at the                             biopsy site.  Rest quietly for 24 hours, no physical activity.  Avoid                              straining, heavy lifting, or strenuous physical activity for approximately                             2 days.  Report any bleeding at aspiration/biopsy site, redness,                             swelling, odor, discharge, pain or fever that does not lessen after one                              day, to your physician.  Resume a regular diet.  Call your physician with                             questions or test results.  Also you may contact the Radiology Nurse                             at [phone] with any additional questions or concerns.    Other: ICE TO SITE IF NEEDED FOR DISCOMFORT.    BRING THIS SHEET WITH YOU SHOULD YOU HAVE TO VISIT AN EMERGENCY ROOM OR SEE YOUR DOCTOR IN THE NEXT 24 HOURS.

## 2024-06-27 ENCOUNTER — HOSPITAL ENCOUNTER (OUTPATIENT)
Dept: ULTRASOUND IMAGING | Facility: HOSPITAL | Age: 64
Discharge: HOME | End: 2024-06-27
Attending: FAMILY MEDICINE

## 2024-06-27 VITALS — SYSTOLIC BLOOD PRESSURE: 122 MMHG | RESPIRATION RATE: 16 BRPM | DIASTOLIC BLOOD PRESSURE: 66 MMHG | HEART RATE: 75 BPM

## 2024-06-27 DIAGNOSIS — R22.1 MASS OF THYROID REGION: ICD-10-CM

## 2024-06-27 PROCEDURE — 10005 FNA BX W/US GDN 1ST LES: CPT | Performed by: FAMILY MEDICINE

## 2024-06-27 PROCEDURE — 88173 CYTOPATH EVAL FNA REPORT: CPT | Performed by: FAMILY MEDICINE

## 2024-06-27 PROCEDURE — 88177 CYTP FNA EVAL EA ADDL: CPT | Performed by: FAMILY MEDICINE

## 2024-06-27 PROCEDURE — 88172 CYTP DX EVAL FNA 1ST EA SITE: CPT | Performed by: FAMILY MEDICINE

## 2024-06-27 NOTE — IMAGING NOTE
1348 Pt arrived to ultrasound room #3     1350  Scans taken by YON ultrasound  sonographer     1353  History taken and as follows:  repeat US bx of right thyroid nodule due to increase in size. Guille GUEVARA  used to translate pre, intra, and post procedure instructions in German. Patient's questions and concerns addressed to patient's satisfaction.      1355 Procedure explained questions answered.    1358 Consent verified and obtained      1407  scans reviewed by Dr. SMALLWOOD    Site marked RIGHT THYROID NODULE     1410 Time out taken      1411 Area cleaned sterile towels  to site. Pathology  was  notified.      1412 Lidocaine 1% 10 milligrams per ml  from kit  was given 2 ml    at 1412     FNA # 1 taken at  1413 with 25 g needle    FNA # 2 taken at 1414  with 25 g needle    1417  Procedure completed area re scanned . Slight bleeding noted per Dr. Smallwood. Rescan area in 15 minutes. Patient denies pain or difficulty swallowing at this time. Area cleaned band aid to site ice pack to site.        1432  Patient rescanned. Okay to discharge per Dr. Smallwood. Patient denies difficulty swallowing. Mild tenderness at bx site. Informed patient this is normal post procedure; ice provided.     1434 Post instructions given  verbal et written with AVS summary sheet provided to patient.  Also instructed patient to refrain from drinking or eating anything hot for several hours after biopsy  to prevent increase bleeding from occurring.    1440  Pt  discharged comfortably.

## 2024-07-01 ENCOUNTER — TELEPHONE (OUTPATIENT)
Dept: FAMILY MEDICINE CLINIC | Facility: CLINIC | Age: 64
End: 2024-07-01

## 2024-07-01 DIAGNOSIS — E04.1 COLLOID THYROID NODULE: Primary | ICD-10-CM

## 2024-07-11 ENCOUNTER — OFFICE VISIT (OUTPATIENT)
Dept: OTOLARYNGOLOGY | Facility: CLINIC | Age: 64
End: 2024-07-11

## 2024-07-11 VITALS — WEIGHT: 110 LBS | BODY MASS INDEX: 17.27 KG/M2 | HEIGHT: 67 IN

## 2024-07-11 DIAGNOSIS — E04.1 THYROID NODULE: Primary | ICD-10-CM

## 2024-07-11 PROCEDURE — 99215 OFFICE O/P EST HI 40 MIN: CPT | Performed by: STUDENT IN AN ORGANIZED HEALTH CARE EDUCATION/TRAINING PROGRAM

## 2024-07-11 NOTE — PROGRESS NOTES
Christine Gabriel is a 63 year old female.   Chief Complaint   Patient presents with    Consult     Patient is here for colloid thyroid nodule consult. Pt had thyroid ultrasound fna  on 2024.     HPI:   63-year-old presents for follow-up regarding a right-sided thyroid nodule.  I had previously seen her for this issue in  and this nodule has increased since then.  She had a recent biopsy that was negative.  She states that she has 2 sisters who had a history of thyroid cancer.  Denies any change in her voice or swallowing.  Her most recent TSH was normal    Current Outpatient Medications   Medication Sig Dispense Refill    Multiple Vitamins-Minerals (MULTIVITAMIN ADULTS) Oral Tab Take 1 tablet by mouth daily. 100 tablet 2      Past Medical History:    Basal cell carcinoma (BCC) of skin of left lower eyelid including canthus    Breast CA (HCC)    left lumpectomy    Breast cancer, left (HCC)    lumpectomy, SLNB, radiation, tamoxifen, Dr. Mark Reyes, [facility], [facility].\"    Colon polyps    Constipation    Exposure to medical therapeutic radiation    L chest    Iron deficiency anemia    Liver cyst    Liver hemangioma    Menorrhagia    endocervical curettage    Pregnancy (HCC)    , ,     SAB (spontaneous ) (HCC)    Thyroid nodule      Social History:  Social History     Socioeconomic History    Marital status:     Number of children: 2   Occupational History    Occupation: homemaker   Tobacco Use    Smoking status: Never    Smokeless tobacco: Never   Vaping Use    Vaping status: Never Used   Substance and Sexual Activity    Alcohol use: Yes     Alcohol/week: 0.0 standard drinks of alcohol     Comment: seldom    Drug use: Never   Other Topics Concern    Caffeine Concern No    Occupational Exposure No    Pt has a pacemaker No    Pt has a defibrillator No    Reaction to local anesthetic No   Social History Narrative    ** Merged History Encounter **            Past Surgical History:   Procedure Laterality Date    Aspir/inj thyroid cyst Right 2022    Breast lumpectomy Left 2010    Chemosurg mohs 1st stage Left     Colonoscopy      Colonoscopy N/A 2022    Procedure: COLONOSCOPY;  Surgeon: Yi Bourgeois MD;  Location: [facility] ENDOSCOPY    Endocervical curettage  2010    Excision of lesion, eyelid - os - left eye Left 2014    BCCa    Hysteroscopy  2010    Per [facility]:  \"hysteroscopy - D&C.\"    Lumpectomy left            ,     Radiation left          Sent lymph node biopsy Left 2010         EXAM:   Ht 5' 7\" (1.702 m)   Wt 110 lb (49.9 kg)   BMI 17.23 kg/m²     System Details   Skin Inspection - Normal.   Constitutional Overall appearance - Normal.   Head/Face Symmetric, TMJ tenderness not present    Eyes EOMI, PERRL   Right ear:  Canal clear, TM intact, no JIM   Left ear:  Canal clear, TM intact, no JIM   Nose: Septum midline, inferior turbinates not enlarged, nasal valves without collapse    Oral cavity/Oropharynx: No lesions or masses on inspection or palpation, tonsils symmetric    Neck: Soft without LAD, thyroid not enlarged  Voice clear/ no stridor   Other:      SCOPES AND PROCEDURES:         AUDIOGRAM AND IMAGING:     US thyroid on   CONCLUSION:   1. Large solid 3.5 cm TR-3 nodule in the right lobe has significantly increased in size since comparison ultrasound/FNA from  (greater than 50% increase in volume of the nodule).  Given increase in size, repeat FNA is recommended.    Alternatively, could consider continued 12 month surveillance imaging.     IMPRESSION:   1. Thyroid nodule  - CT SOFT TISSUE OF NECK(CONTRAST ONLY) (CPT=70491); Future       Recommendations:  -Given the increase in size to greater than 3 cm, an increase in the volume of the nodule by greater than 50% since  and that she has 2 sisters with a reported history of thyroid cancer we will proceed with  excision of this right-sided nodule.  There is an increased chance of a false negative from the FNA when the nodule is greater than 3 cm.  Discussed that if this returns as a malignancy may need to have a completion thyroidectomy, currently we will just proceed with a right-sided hemithyroidectomy.  -Will obtain a CT scan of her neck for preoperative planning  -Will obtain preoperative medical clearance    I educated the patient on the pathophysiology of their thyroid disorder. Thyroidectomy was discussed including risks, benefits and alternatives. Risks include injury to the nerves that control the vocal cords causing temporary or permanent hoarseness or respiratory distress, hypocalcemia from hypoparathyroidism, bleeding, infection, risks of general anesthesia, and need for additional procedures. The patient understands, all questions were answered and would like to proceed.     The patient indicates understanding of these issues and agrees to the plan.  Longitudinal care will be provided with surgery and follow-up  Greater than 40 minutes was spent reviewing documentation including the previous ultrasound, biopsy reports and discussion with the patient regarding the surgery, documentation of coordination of care    Franki Milner MD  7/11/2024  2:27 PM

## 2024-07-15 ENCOUNTER — TELEPHONE (OUTPATIENT)
Dept: FAMILY MEDICINE CLINIC | Facility: CLINIC | Age: 64
End: 2024-07-15

## 2024-07-15 ENCOUNTER — TELEPHONE (OUTPATIENT)
Dept: OTOLARYNGOLOGY | Facility: CLINIC | Age: 64
End: 2024-07-15

## 2024-07-15 DIAGNOSIS — E04.1 THYROID NODULE: Primary | ICD-10-CM

## 2024-07-15 NOTE — TELEPHONE ENCOUNTER
Patient scheduled for RIGHT VLADIMIR-THYROIDECTOMY on 8/21/24 at [facility]. Reminded patient to schedule CT scan before surgery. Informed patient to obtain pre operative surgical clearance from PCP.     Maltese interpretor #[id number]

## 2024-07-15 NOTE — TELEPHONE ENCOUNTER
We received a request for medical clearance for surgical procedure on 8/21/24    RIGHT VLADIMIR-THYROIDECTOMY    Please assist with apt within 30 days of surgery date   Patient already has an apt for a physical 7/29/24 which is a separate apt from medical clearance - may use res 24 if needed

## 2024-07-29 ENCOUNTER — OFFICE VISIT (OUTPATIENT)
Dept: SURGERY | Facility: CLINIC | Age: 64
End: 2024-07-29
Payer: MEDICAID

## 2024-07-29 ENCOUNTER — OFFICE VISIT (OUTPATIENT)
Dept: FAMILY MEDICINE CLINIC | Facility: CLINIC | Age: 64
End: 2024-07-29

## 2024-07-29 ENCOUNTER — PATIENT MESSAGE (OUTPATIENT)
Dept: ADMINISTRATIVE | Age: 64
End: 2024-07-29

## 2024-07-29 VITALS
HEART RATE: 86 BPM | TEMPERATURE: 97 F | HEIGHT: 67 IN | SYSTOLIC BLOOD PRESSURE: 115 MMHG | DIASTOLIC BLOOD PRESSURE: 64 MMHG | BODY MASS INDEX: 17.74 KG/M2 | WEIGHT: 113 LBS

## 2024-07-29 VITALS — BODY MASS INDEX: 17.27 KG/M2 | WEIGHT: 110 LBS | HEIGHT: 67 IN

## 2024-07-29 DIAGNOSIS — K82.4 GALLBLADDER POLYP: ICD-10-CM

## 2024-07-29 DIAGNOSIS — R63.6 UNDERWEIGHT: ICD-10-CM

## 2024-07-29 DIAGNOSIS — E04.1 RIGHT THYROID NODULE: ICD-10-CM

## 2024-07-29 DIAGNOSIS — F51.01 PRIMARY INSOMNIA: ICD-10-CM

## 2024-07-29 DIAGNOSIS — R10.10 PAIN OF UPPER ABDOMEN: Primary | ICD-10-CM

## 2024-07-29 DIAGNOSIS — Z12.4 CERVICAL CANCER SCREENING: ICD-10-CM

## 2024-07-29 DIAGNOSIS — Z00.00 ADULT GENERAL MEDICAL EXAM: Primary | ICD-10-CM

## 2024-07-29 PROCEDURE — 99214 OFFICE O/P EST MOD 30 MIN: CPT | Performed by: SURGERY

## 2024-07-29 NOTE — PROGRESS NOTES
Patient ID: Christine Gabriel is a 63 year old female.    HPI  Chief Complaint   Patient presents with    Routine Physical      is Josephine #[id number]    She is up-to-date on her colonoscopy and not due until 2029.  She states she tried temazepam for insomnia but it caused her to be very groggy the next day and feeling dizzy.  She instead takes over-the-counter Doxylamine daily and helps and would like to continue.    She states she has been thin since she was 19 years of age and is even seen dietitians about this.    She just saw Dr. Rashid today for general surgery due to a gallbladder polyp which is very tiny.  He ordered a HIDA scan on her.  She states she always has some abdominal bloating or some discomfort.  She has discussed this with GI in the past as well..         Health Maintenance   Topic Date Due    COVID-19 Vaccine (3 - 2023-24 season) 09/01/2023    Annual Depression Screening  01/01/2024    Annual Physical  07/18/2024    Mammogram  09/05/2024    Influenza Vaccine (1) 10/01/2024    Pap Smear  01/24/2026    Colorectal Cancer Screening  07/20/2029    DTaP,Tdap,and Td Vaccines (3 - Td or Tdap) 07/12/2032    Pneumococcal Vaccine: Birth to 64yrs  Completed    Zoster Vaccines  Completed       =======================================================    Lab Results   Component Value Date    WBC 4.6 07/18/2023    RBC 4.08 07/18/2023    HGB 12.0 07/18/2023    HCT 37.0 07/18/2023    .0 (L) 07/18/2023    MPV 11.2 (H) 03/12/2016    MCV 90.7 07/18/2023    MCH 29.4 07/18/2023    MCHC 32.4 07/18/2023    RDW 12.3 07/18/2023    NEPRELIM 2.71 07/18/2023    NEUT 1.4 (L) 03/12/2016    LYMPH 1.4 03/12/2016    MON 0.3 03/12/2016    EOS 0.0 03/12/2016    BASO 0.1 03/12/2016    NEPERCENT 58.8 07/18/2023    LYPERCENT 28.9 07/18/2023    MOPERCENT 8.0 07/18/2023    EOPERCENT 3.0 07/18/2023    BAPERCENT 0.9 07/18/2023    NE 2.71 07/18/2023    LYMABS 1.33 07/18/2023    MOABSO 0.37 07/18/2023    EOABSO 0.14  07/18/2023    BAABSO 0.04 07/18/2023       Lab Results   Component Value Date     (H) 07/18/2023    BUN 15 07/18/2023    BUNCREA 13.7 07/12/2022    CREATSERUM 0.69 07/18/2023    ANIONGAP 7 07/18/2023    GFRNAA 90 07/12/2022    GFRAA 104 07/12/2022    CA 9.7 07/18/2023    OSMOCALC 291 07/18/2023    ALKPHO 69 07/18/2023    AST 18 07/18/2023    ALT 29 07/18/2023    ALKPHOS 56 03/12/2016    BILT 0.6 07/18/2023    TP 7.5 07/18/2023    ALB 4.2 07/18/2023    GLOBULIN 3.3 07/18/2023    AGRATIO 1.3 03/12/2016     07/18/2023    K 5.0 07/18/2023     07/18/2023    CO2 25.0 07/18/2023       Lab Results   Component Value Date     (H) 07/18/2023    BUN 15 07/18/2023    CREATSERUM 0.69 07/18/2023    BUNCREA 13.7 07/12/2022    ANIONGAP 7 07/18/2023    GFRAA 104 07/12/2022    GFRNAA 90 07/12/2022    CA 9.7 07/18/2023     07/18/2023    K 5.0 07/18/2023     07/18/2023    CO2 25.0 07/18/2023    OSMOCALC 291 07/18/2023       Lab Results   Component Value Date    COLORUR Yellow 07/12/2022    CLARITY Clear 07/12/2022    SPECGRAVITY 1.020 02/16/2024    GLUUR Negative 07/12/2022    BILUR Negative 07/12/2022    KETUR Negative 07/12/2022    BLOODURINE Moderate (A) 07/12/2022    PHURINE 6.5 02/16/2024    PROUR Negative 07/12/2022    UROBILINOGEN <2.0 07/12/2022    NITRITE Negative 02/16/2024    LEUUR Trace (A) 07/12/2022    ASCACIDURINE Negative 03/12/2016    WBCUR 11-20 (A) 07/12/2022    RBCUR 0-2 07/12/2022    EPIUR Few (A) 07/12/2022    BACUR None Seen 07/12/2022    MICCOM Completed 03/12/2016     Lab Results   Component Value Date     07/12/2022    A1C 5.5 07/12/2022    A1C 5.2 03/16/2021       No results found for: \"MALBP\", \"CREUR\", \"CREAURINE\", \"MIALBURINE\", \"MCRRATIOUR\", \"MALBCRECALC\", \"MICROALBUMIN\", \"CREAUR\", \"MALBCREACALC\"    Lab Results   Component Value Date    CHOLEST 172 07/18/2023    TRIG 84 07/18/2023    HDL 62 (H) 07/18/2023    LDL 94 07/18/2023    VLDL 14 07/18/2023    NONHDLC 110  07/18/2023    CALCNONHDL 99 03/12/2016    CALCNONHDL 126 12/04/2013     T4,FREE (S) (ng/dL)   Date Value   04/10/2013 0.80     TSH (mIU/mL)   Date Value   04/26/2024 3.824     TSH (S) (uIU/mL)   Date Value   04/10/2013 3.29       Lab Results   Component Value Date    VITB12 >1,500 (H) 03/12/2016       Lab Results   Component Value Date    IRON 103 04/10/2013       No results found for: \"SAT\"    Lab Results   Component Value Date    IRON 103 04/10/2013    TIBC 322 04/10/2013    IRONSAT 31 04/10/2013    TRANSFERRIN 244 04/10/2013       Lab Results   Component Value Date    REMIGIO 77.5 04/26/2024       Lab Results   Component Value Date    VITD 31.4 04/26/2024    XHFR48IK 21.4 03/12/2016     No results found for: \"PSA\", \"QPSA\", \"TOTPSASCREEN\"    =======================================================    Wt Readings from Last 6 Encounters:   07/29/24 113 lb (51.3 kg)   07/29/24 110 lb (49.9 kg)   07/11/24 110 lb (49.9 kg)   05/21/24 108 lb 9.6 oz (49.3 kg)   04/26/24 111 lb 9.6 oz (50.6 kg)   03/01/24 112 lb (50.8 kg)               BMI Readings from Last 6 Encounters:   07/29/24 17.70 kg/m²   07/29/24 17.23 kg/m²   07/11/24 17.23 kg/m²   05/21/24 17.01 kg/m²   04/26/24 17.48 kg/m²   03/01/24 17.54 kg/m²       BP Readings from Last 6 Encounters:   07/29/24 136/68   06/27/24 122/66   05/21/24 128/67   04/26/24 135/64   03/01/24 138/78   02/16/24 121/80         Review of Systems  She has no chest pain or shortness of breath.  She has a history of depression and anxiety, longstanding upper abdominal discomfort.    Past Medical History:    Basal cell carcinoma (BCC) of skin of left lower eyelid including canthus    Breast CA (HCC)    left lumpectomy    Breast cancer, left (HCC)    lumpectomy, SLNB, radiation, tamoxifen, Dr. Mark Reyes, [facility], [facility].\"    Colon polyps    Constipation    Exposure to medical therapeutic radiation    L chest    Iron deficiency anemia    Liver cyst    Liver  hemangioma    Menorrhagia    endocervical curettage    Pregnancy (HCC)    , ,     SAB (spontaneous ) (HCC)    Thyroid nodule       Past Surgical History:   Procedure Laterality Date    Aspir/inj thyroid cyst Right 2022    Breast lumpectomy Left 2010    Chemosurg mohs 1st stage Left     Colonoscopy      Colonoscopy N/A 2022    Procedure: COLONOSCOPY;  Surgeon: Yi Bourgeois MD;  [facility] ENDOSCOPY    Endocervical curettage  2010    Excision of lesion, eyelid - os - left eye Left 2014    BCCa    Hysteroscopy  2010    Per [facility]:  \"hysteroscopy - D&C.\"    Lumpectomy left            ,     Radiation left          Sent lymph node biopsy Left 2010       Social History     Socioeconomic History    Marital status:      Spouse name: Not on file    Number of children: 2    Years of education: Not on file    Highest education level: Not on file   Occupational History    Occupation: homemaker   Tobacco Use    Smoking status: Never    Smokeless tobacco: Never   Vaping Use    Vaping status: Never Used   Substance and Sexual Activity    Alcohol use: Yes     Alcohol/week: 0.0 standard drinks of alcohol     Comment: seldom    Drug use: Never    Sexual activity: Not on file   Other Topics Concern     Service Not Asked    Blood Transfusions Not Asked    Caffeine Concern No    Occupational Exposure No    Hobby Hazards Not Asked    Sleep Concern Not Asked    Stress Concern Not Asked    Weight Concern Not Asked    Special Diet Not Asked    Back Care Not Asked    Exercise Not Asked    Bike Helmet Not Asked    Seat Belt Not Asked    Self-Exams Not Asked    Grew up on a farm Not Asked    History of tanning Not Asked    Outdoor occupation Not Asked    Pt has a pacemaker No    Pt has a defibrillator No    Breast feeding Not Asked    Reaction to local anesthetic No   Social History Narrative    ** Merged History Encounter **           Social Determinants of Health     Financial Resource Strain: Not on file   Food Insecurity: Not on file   Transportation Needs: Not on file   Physical Activity: Not on file   Stress: Not on file   Social Connections: Not on file   Housing Stability: Not on file          Current Outpatient Medications   Medication Sig Dispense Refill    DOXYLAMINE-DM OR Take by mouth.      Multiple Vitamins-Minerals (MULTIVITAMIN ADULTS) Oral Tab Take 1 tablet by mouth daily. 100 tablet 2     Allergies:  Allergies   Allergen Reactions    Latex ITCHING      PHYSICAL EXAM:   Physical Exam  Physical Exam   Constitutional: . She appears thin build.  No distress.   HENT:   Head: Normocephalic.   Right Ear: Tympanic membrane and ear canal normal.   Left Ear: Tympanic membrane and ear canal normal.   Mouth/Throat: Oropharynx is clear and moist and mucous membranes are normal.   Eyes: Conjunctivae and EOM are normal. Pupils are equal, round, and reactive to light.   Neck: Normal range of motion. Neck supple.  +2 cm thyroid nodule on the right  Cardiovascular: Normal rate, regular rhythm and no murmur heard.  Pulmonary/Chest: Effort normal and breath sounds normal. No respiratory distress.   Abdominal: Soft. Bowel sounds are normal. There is no hepatosplenomegaly.   Lymphadenopathy:     She has no  cervical adenopathy.   Neurological: She is alert and oriented to person, place, and time . She has normal reflexes. No cranial nerve deficit.   Skin: Skin is warm and dry. No rash noted.   Psychiatric: She has a anxious affect.  She goes from 1 subject to another to another and needs quite a bit of reassurance.  Vitals reviewed.    Blood pressure 136/68, pulse 86, temperature 97.2 °F (36.2 °C), temperature source Temporal, height 5' 7\" (1.702 m), weight 113 lb (51.3 kg), not currently breastfeeding.  Vitals:    07/29/24 1330 07/29/24 1344   BP: 136/68 115/64   Pulse: 86    Temp: 97.2 °F (36.2 °C)    TempSrc: Temporal    Weight: 113 lb (51.3  kg)    Height: 5' 7\" (1.702 m)             ASSESSMENT/PLAN:     Diagnoses and all orders for this visit:    Adult general medical exam  -     CBC With Differential With Platelet; Future  -     Comp Metabolic Panel (14); Future  -     Lipid Panel; Future  Reviewed the past labs.  Cervical cancer screening  -     OBG - INTERNAL    Gallbladder polyp  She had many questions about this.  We discussed the HIDA scan that Dr. Rashid had ordered.  Primary insomnia  She can continue the over-the-counter medication considering it is helping her.  Right thyroid nodule  She is already had a biopsy of this and states she has an ENT who will be removing this mass in the near future.  Underweight  -     DIETITIAN EDUCATION INITIAL, DIET (INTERNAL)  I think she needs to see a dietitian to discuss her desire to gain weight.  We talked about eating more protein.  I have not  Rosmery protein powder but then she complained that this caused her GI upset.      Referrals (if applicable)  Orders Placed This Encounter   Procedures    OBG - INTERNAL     Can see Dr Garcia, Dr Olivia, or Dr Morgan.   DR GARCIA IS AT THE [place] LOCATION ALSO.     Referral Priority:   Routine     Referral Type:   OFFICE VISIT     Referred to Provider:   Austin Garcia MD     Requested Specialty:   OBSTETRICS & GYNECOLOGY     Number of Visits Requested:   3    DIETITIAN EDUCATION INITIAL, DIET (INTERNAL)     Referral Priority:   Routine     Referral Type:   Nutrition     Requested Specialty:   Dietitian     Number of Visits Requested:   1         Follow up if symptoms persist.  Take medicine (if given) as prescribed.  Approach to treatment discussed and patient/family member understands and agrees to plan.     No follow-ups on file.      Kofi Field DO  7/29/2024

## 2024-07-29 NOTE — H&P
History and Physical      HPI     Chief Complaint   Patient presents with    Gallbladder     Pt referred by Dr. FABIAN Morgan for gallbladder.  Pt c/o abdominal pain and constipation.  Pt denies N&V.        HPI  Christine Gabriel is a 63 year old female who presents with of abdominal pain.  She had a ultrasound that showed multiple benign liver cysts and a 3 mm polyp.  She feels convinced that the pain is from her gallbladder polyp.  I would like to do a HIDA scan.  This was all explained using a     Past Medical History:    Basal cell carcinoma (BCC) of skin of left lower eyelid including canthus    Breast CA (HCC)    left lumpectomy    Breast cancer, left (HCC)    lumpectomy, SLNB, radiation, tamoxifen, Dr. Mark Reyes, [facility], [facility].\"    Colon polyps    Constipation    Exposure to medical therapeutic radiation    L chest    Iron deficiency anemia    Liver cyst    Liver hemangioma    Menorrhagia    endocervical curettage    Pregnancy (HCC)    , ,     SAB (spontaneous ) (HCC)    Thyroid nodule     Past Surgical History:   Procedure Laterality Date    Aspir/inj thyroid cyst Right 2022    Breast lumpectomy Left 2010    Chemosurg mohs 1st stage Left     Colonoscopy      Colonoscopy N/A 2022    Procedure: COLONOSCOPY;  Surgeon: Yi Bourgeois MD;  Location: [facility] ENDOSCOPY    Endocervical curettage  2010    Excision of lesion, eyelid - os - left eye Left 2014    BCCa    Hysteroscopy  2010    Per [facility]:  \"hysteroscopy - D&C.\"    Lumpectomy left            ,     Radiation left          Sent lymph node biopsy Left 2010     Current Outpatient Medications   Medication Sig Dispense Refill    Multiple Vitamins-Minerals (MULTIVITAMIN ADULTS) Oral Tab Take 1 tablet by mouth daily. 100 tablet 2     ALLERGIES  Allergies   Allergen Reactions    Latex ITCHING       Social History      Socioeconomic History    Marital status:     Number of children: 2   Occupational History    Occupation: homemaker   Tobacco Use    Smoking status: Never    Smokeless tobacco: Never   Vaping Use    Vaping status: Never Used   Substance and Sexual Activity    Alcohol use: Yes     Alcohol/week: 0.0 standard drinks of alcohol     Comment: seldom    Drug use: Never     Family History   Problem Relation Age of Onset    Breast Cancer Self     Heart Disease Mother 88    Other (accidental death) Father 66    Breast Cancer Sister 58    Cancer Sister         Family h/o Thyroid Cancer    Cancer Brother         Family h/o Liver Cancer    Other (oral cancer) Brother     Other (alive and well) Son         x2    Breast Cancer Maternal Cousin Female     Breast Cancer Maternal Cousin Female     Breast Cancer Paternal Cousin Female     Breast Cancer Other        Review of Systems   A comprehensive 10 point review of systems was completed.  Pertinent positives and negatives noted in the the HPI.    PHYSICAL EXAM   Ht 5' 7\" (1.702 m)   Wt 110 lb (49.9 kg)   BMI 17.23 kg/m²  No LMP recorded. (Menstrual status: Menopause).   Constitutional: appears well hydrated alert and responsive no acute distress noted  Head/Face: normocephalic  Nose/Mouth/Throat: nose and throat are clear palate is intact mucous membranes are moist no oral lesions are noted  Neck/Thyroid: neck is supple without adenopathy  Respiratory: normal to inspection lungs are clear to auscultation bilaterally normal respiratory effort  Cardiovascular: regular rate and rhythm no murmurs, gallups, or rubs  Abdomen: soft non-tender non-distended no organomegaly noted no masses  Extremities: no edema, cyanosis, or clubbing  Neurological: exam appropriate for age reflexes and motor skills appropriate for age      ASSESSMENT/PLAN   Assessment   Abdominal pain, etiology unclear  Small gallbladder polyp    63 year old female with gallbladder polyp, abdominal pain  We  have discussed the surgical risks, benefits, alternatives, and expected recovery. We will plan HIDA scan with [facility]. All of the patient's questions have been answered to her satisfaction.       7/29/2024  Omar Rashid MD

## 2024-07-31 ENCOUNTER — LAB ENCOUNTER (OUTPATIENT)
Dept: LAB | Age: 64
End: 2024-07-31
Attending: FAMILY MEDICINE
Payer: MEDICAID

## 2024-07-31 DIAGNOSIS — Z00.00 ADULT GENERAL MEDICAL EXAM: ICD-10-CM

## 2024-07-31 LAB
ALBUMIN SERPL-MCNC: 4.6 G/DL (ref 3.2–4.8)
ALBUMIN/GLOB SERPL: 1.6 {RATIO} (ref 1–2)
ALP LIVER SERPL-CCNC: 74 U/L
ALT SERPL-CCNC: 16 U/L
ANION GAP SERPL CALC-SCNC: 5 MMOL/L (ref 0–18)
AST SERPL-CCNC: 21 U/L (ref ?–34)
BASOPHILS # BLD AUTO: 0.05 X10(3) UL (ref 0–0.2)
BASOPHILS NFR BLD AUTO: 0.8 %
BILIRUB SERPL-MCNC: 0.8 MG/DL (ref 0.2–1.1)
BUN BLD-MCNC: 15 MG/DL (ref 9–23)
BUN/CREAT SERPL: 19.2 (ref 10–20)
CALCIUM BLD-MCNC: 9.7 MG/DL (ref 8.7–10.4)
CHLORIDE SERPL-SCNC: 107 MMOL/L (ref 98–112)
CHOLEST SERPL-MCNC: 195 MG/DL (ref ?–200)
CO2 SERPL-SCNC: 28 MMOL/L (ref 21–32)
CREAT BLD-MCNC: 0.78 MG/DL
DEPRECATED RDW RBC AUTO: 39.7 FL (ref 35.1–46.3)
EGFRCR SERPLBLD CKD-EPI 2021: 86 ML/MIN/1.73M2 (ref 60–?)
EOSINOPHIL # BLD AUTO: 0.16 X10(3) UL (ref 0–0.7)
EOSINOPHIL NFR BLD AUTO: 2.5 %
ERYTHROCYTE [DISTWIDTH] IN BLOOD BY AUTOMATED COUNT: 12.2 % (ref 11–15)
FASTING PATIENT LIPID ANSWER: YES
FASTING STATUS PATIENT QL REPORTED: YES
GLOBULIN PLAS-MCNC: 2.8 G/DL (ref 2–3.5)
GLUCOSE BLD-MCNC: 95 MG/DL (ref 70–99)
HCT VFR BLD AUTO: 36.2 %
HDLC SERPL-MCNC: 51 MG/DL (ref 40–59)
HGB BLD-MCNC: 12.1 G/DL
IMM GRANULOCYTES # BLD AUTO: 0.02 X10(3) UL (ref 0–1)
IMM GRANULOCYTES NFR BLD: 0.3 %
LDLC SERPL CALC-MCNC: 126 MG/DL (ref ?–100)
LYMPHOCYTES # BLD AUTO: 2.4 X10(3) UL (ref 1–4)
LYMPHOCYTES NFR BLD AUTO: 37.4 %
MCH RBC QN AUTO: 29.8 PG (ref 26–34)
MCHC RBC AUTO-ENTMCNC: 33.4 G/DL (ref 31–37)
MCV RBC AUTO: 89.2 FL
MONOCYTES # BLD AUTO: 0.47 X10(3) UL (ref 0.1–1)
MONOCYTES NFR BLD AUTO: 7.3 %
NEUTROPHILS # BLD AUTO: 3.31 X10 (3) UL (ref 1.5–7.7)
NEUTROPHILS # BLD AUTO: 3.31 X10(3) UL (ref 1.5–7.7)
NEUTROPHILS NFR BLD AUTO: 51.7 %
NONHDLC SERPL-MCNC: 144 MG/DL (ref ?–130)
OSMOLALITY SERPL CALC.SUM OF ELEC: 291 MOSM/KG (ref 275–295)
PLATELET # BLD AUTO: 162 10(3)UL (ref 150–450)
POTASSIUM SERPL-SCNC: 5.1 MMOL/L (ref 3.5–5.1)
PROT SERPL-MCNC: 7.4 G/DL (ref 5.7–8.2)
RBC # BLD AUTO: 4.06 X10(6)UL
SODIUM SERPL-SCNC: 140 MMOL/L (ref 136–145)
TRIGL SERPL-MCNC: 98 MG/DL (ref 30–149)
VLDLC SERPL CALC-MCNC: 17 MG/DL (ref 0–30)
WBC # BLD AUTO: 6.4 X10(3) UL (ref 4–11)

## 2024-07-31 PROCEDURE — 80053 COMPREHEN METABOLIC PANEL: CPT

## 2024-07-31 PROCEDURE — 80061 LIPID PANEL: CPT

## 2024-07-31 PROCEDURE — 85025 COMPLETE CBC W/AUTO DIFF WBC: CPT

## 2024-07-31 PROCEDURE — 36415 COLL VENOUS BLD VENIPUNCTURE: CPT

## 2024-08-05 ENCOUNTER — TELEPHONE (OUTPATIENT)
Dept: SURGERY | Facility: CLINIC | Age: 64
End: 2024-08-05

## 2024-08-05 NOTE — TELEPHONE ENCOUNTER
Patient asking if she can just have the surgery and to wait for the test.  Advised patient to have the test as soon as possible.  Explained the purpose of the test.

## 2024-08-08 ENCOUNTER — HOSPITAL ENCOUNTER (OUTPATIENT)
Dept: NUCLEAR MEDICINE | Facility: HOSPITAL | Age: 64
Discharge: HOME | End: 2024-08-08
Attending: SURGERY
Payer: MEDICAID

## 2024-08-08 DIAGNOSIS — R10.10 PAIN OF UPPER ABDOMEN: ICD-10-CM

## 2024-08-08 PROCEDURE — 78227 HEPATOBIL SYST IMAGE W/DRUG: CPT | Performed by: SURGERY

## 2024-08-09 ENCOUNTER — TELEPHONE (OUTPATIENT)
Dept: OTOLARYNGOLOGY | Facility: CLINIC | Age: 64
End: 2024-08-09

## 2024-08-09 NOTE — TELEPHONE ENCOUNTER
Called patient to follow up on pre-operative clearance with primary care doctor and pending CT scan. Patient states she was unaware she had an appointment on 8/2 with her primary. Advised patient to call her primary care doctor's office to follow up on medical clearance before surgery. Reminded patient to obtain CT scan prior to surgery as well. Patient states she has central scheduling phone number.     Yemeni interpretor #[id number]

## 2024-08-12 ENCOUNTER — TELEPHONE (OUTPATIENT)
Dept: FAMILY MEDICINE CLINIC | Facility: CLINIC | Age: 64
End: 2024-08-12

## 2024-08-12 NOTE — TELEPHONE ENCOUNTER
[facility]  Meagan id [id number]    Patient asking for a Pre-op Clearance Visit with Dr Field; she has surgery  8-21-24.    Call patient at:    [phone]   Okay to leave a voicemail    Per patient: she said to call her back soon with an appointment; sometimes we say someone will call patient back but we do not.

## 2024-08-15 ENCOUNTER — HOSPITAL ENCOUNTER (OUTPATIENT)
Dept: GENERAL RADIOLOGY | Age: 64
Discharge: HOME | End: 2024-08-15
Attending: FAMILY MEDICINE
Payer: MEDICAID

## 2024-08-15 ENCOUNTER — OFFICE VISIT (OUTPATIENT)
Dept: FAMILY MEDICINE CLINIC | Facility: CLINIC | Age: 64
End: 2024-08-15

## 2024-08-15 ENCOUNTER — EKG ENCOUNTER (OUTPATIENT)
Dept: LAB | Age: 64
End: 2024-08-15
Attending: FAMILY MEDICINE
Payer: MEDICAID

## 2024-08-15 VITALS
SYSTOLIC BLOOD PRESSURE: 131 MMHG | HEART RATE: 72 BPM | WEIGHT: 114 LBS | DIASTOLIC BLOOD PRESSURE: 77 MMHG | TEMPERATURE: 97 F | BODY MASS INDEX: 17.89 KG/M2 | HEIGHT: 67 IN

## 2024-08-15 DIAGNOSIS — Z85.3 HISTORY OF LEFT BREAST CANCER: ICD-10-CM

## 2024-08-15 DIAGNOSIS — Z01.818 PREOP EXAMINATION: ICD-10-CM

## 2024-08-15 DIAGNOSIS — F41.9 ANXIETY: ICD-10-CM

## 2024-08-15 DIAGNOSIS — E04.1 COLLOID THYROID NODULE: ICD-10-CM

## 2024-08-15 DIAGNOSIS — E04.1 RIGHT THYROID NODULE: Primary | ICD-10-CM

## 2024-08-15 DIAGNOSIS — F51.02 INSOMNIA DUE TO PSYCHOLOGICAL STRESS: ICD-10-CM

## 2024-08-15 DIAGNOSIS — K82.4 GALLBLADDER POLYP: ICD-10-CM

## 2024-08-15 LAB
ATRIAL RATE: 64 BPM
P AXIS: 79 DEGREES
P-R INTERVAL: 138 MS
Q-T INTERVAL: 414 MS
QRS DURATION: 92 MS
QTC CALCULATION (BEZET): 427 MS
R AXIS: 61 DEGREES
T AXIS: 77 DEGREES
VENTRICULAR RATE: 64 BPM

## 2024-08-15 PROCEDURE — 93005 ELECTROCARDIOGRAM TRACING: CPT

## 2024-08-15 PROCEDURE — 71046 X-RAY EXAM CHEST 2 VIEWS: CPT | Performed by: FAMILY MEDICINE

## 2024-08-15 PROCEDURE — 99214 OFFICE O/P EST MOD 30 MIN: CPT | Performed by: FAMILY MEDICINE

## 2024-08-15 PROCEDURE — 93010 ELECTROCARDIOGRAM REPORT: CPT | Performed by: INTERNAL MEDICINE

## 2024-08-15 RX ORDER — MIRTAZAPINE 15 MG/1
15 TABLET, FILM COATED ORAL NIGHTLY
Qty: 30 TABLET | Refills: 1 | Status: SHIPPED | OUTPATIENT
Start: 2024-08-15 | End: 2024-10-14

## 2024-08-15 NOTE — PROGRESS NOTES
Patient ID: Christine Gabriel is a 63 year old female.    HPI  Chief Complaint   Patient presents with    Pre-Op Exam      Diogo #[id number] .    She is here for preoperative clearance for RIGHT VLADIMIR-THYROIDECTOMY on 8/21/24 at [facility] with Dr. Franki Milner.  She has had surgery in the past and no anesthesia complications.  She has a large colloid thyroid nodule on the right side.  She has had no bleeding disorders.    She has many questions about the small gallbladder polyp.  She had a HIDA scan by Dr. Rashid the general surgeon which she wanted me to review with her which I did.  It was normal and showed no issue with her gallbladder.  She states she spoke to some relatives who told her the gallbladder polyp can turn cancerous and cause issues for her liver but I explained that it is very small but it is best to see Dr. Rashid as she still has numerous questions.    She has anxiety and sleep issues for quite some time.  He states because of this pending surgery she has been quite nervous and was hoping for something to help her sleep and calm her down.      Wt Readings from Last 6 Encounters:   08/15/24 114 lb (51.7 kg)   08/01/24 113 lb (51.3 kg)   07/29/24 113 lb (51.3 kg)   07/29/24 110 lb (49.9 kg)   08/08/24 110 lb (49.9 kg)   07/11/24 110 lb (49.9 kg)       BMI Readings from Last 6 Encounters:   08/15/24 17.85 kg/m²   08/01/24 17.70 kg/m²   07/29/24 17.70 kg/m²   07/29/24 17.23 kg/m²   08/08/24 17.27 kg/m²   07/11/24 17.23 kg/m²       BP Readings from Last 6 Encounters:   08/15/24 131/77   07/29/24 115/64   06/27/24 122/66   05/21/24 128/67   04/26/24 135/64   03/01/24 138/78         Review of Systems  Denies any chest pain, shortness of breath.  No abdominal discomfort.      Medical History:      Past Medical History:    Anxiety state    Basal cell carcinoma (BCC) of skin of left lower eyelid including canthus    Breast CA (HCC)    left lumpectomy    Breast cancer, left (HCC)    lumpectomy,  SLNB, radiation, tamoxifen, Dr. Mark Reyes, [facility], [facility].\"    Colon polyps    Constipation    Depression    Disorder of thyroid    Exposure to medical diagnostic radiation    Exposure to medical therapeutic radiation    L chest    Iron deficiency anemia    Liver cyst    Liver hemangioma    Menorrhagia    endocervical curettage    PONV (postoperative nausea and vomiting)    Pregnancy (HCC)    , ,     SAB (spontaneous ) (HCC)    Thyroid nodule    Visual impairment       Past Surgical History:   Procedure Laterality Date    Aspir/inj thyroid cyst Right 2022    Breast lumpectomy Left 2010    Chemosurg mohs 1st stage Left     Colonoscopy      Colonoscopy N/A 2022    Procedure: COLONOSCOPY;  Surgeon: Yi Bourgeois MD;  Location: [facility] ENDOSCOPY    Cystoscopy,insert ureteral stent      Endocervical curettage  2010    Excision of lesion, eyelid - os - left eye Left 2014    BCCa    Hysteroscopy  2010    Per [facility]:  \"hysteroscopy - D&C.\"    Lumpectomy left            ,     Radiation left          Sent lymph node biopsy Left 2010          No current outpatient medications on file.     Allergies:  Allergies   Allergen Reactions    Latex ITCHING        Physical Exam:       Physical Exam  Blood pressure 131/77, pulse 72, temperature 96.8 °F (36 °C), temperature source Temporal, height 5' 7\" (1.702 m), weight 114 lb (51.7 kg), not currently breastfeeding.  Physical Exam   Constitutional: Patient is oriented to person, place, and time. Patient appears well-developed and well-nourished. No distress.   HENT:   Head: Normocephalic and atraumatic.   Neck: Normal range of motion. Neck supple.  Large 3 cm nodule on the right lobe of the thyroid.  Lymphadenopathy:     Has  no cervical adenopathy.   Cardiovascular: Normal rate, regular rhythm and no murmur heard.  Pulmonary/Chest: Effort normal and breath sounds  normal. No respiratory distress.  No trouble speaking  Neurological: Patient is alert and oriented to person, place, and time.   Skin: Skin is warm and dry.  No pallor or diaphoresis  Psychiatric: Patient has a anxious affect.  Needs quite a bit of reassurance.  No edema of the legs  Nursing note and vitals reviewed.           Assessment/Plan:      Diagnoses and all orders for this visit:    Right thyroid nodule  She already had a CBC, CMP, lipid panel just recently.  I reviewed those results with her.  We would do an EKG and a chest x-ray to clear her for surgery.  Thank you Franki for allowing me to be a part of patient's care.    After review of her EKG, chest x-ray labs she is medically optimized for her procedure and can proceed.  Colloid thyroid nodule  As above  History of left breast cancer  No recurrence  Preop examination  -     EKG 12 Lead; Future  -     XR CHEST PA + LAT CHEST (CPT=71046); Future    Gallbladder polyp  -     SURGERY - INTERNAL  Reviewed her scans with her but she would like to see Dr. Rashdi and I went ahead and did a referral  Anxiety  -     mirtazapine 15 MG Oral Tab; Take 1 tablet (15 mg total) by mouth nightly. For mood and to help sleep  Long history of anxiety.  I am hoping she stays on the medication but we will see.  Insomnia due to psychological stress  -     mirtazapine 15 MG Oral Tab; Take 1 tablet (15 mg total) by mouth nightly. For mood and to help sleep        Referrals (if applicable)  Orders Placed This Encounter   Procedures    SURGERY - INTERNAL     If he is busy, guy can see 1 of his partners.    Has many questions about the gallbladder polyp.  I explained to her that it is benign.  I also explained that the HIDA scan was normal.     Referral Priority:   Routine     Referral Type:   OFFICE VISIT     Referred to Provider:   Omar Rashid MD     Requested Specialty:   SURGERY, GENERAL     Number of Visits Requested:   3         Follow up if symptoms persist.  Take medicine  (if given) as prescribed.  Approach to treatment discussed and patient/family member understands and agrees to plan.     No follow-ups on file.        Kofi Field DO  8/15/2024

## 2024-08-16 ENCOUNTER — TELEPHONE (OUTPATIENT)
Dept: FAMILY MEDICINE CLINIC | Facility: CLINIC | Age: 64
End: 2024-08-16

## 2024-08-16 NOTE — TELEPHONE ENCOUNTER
Franki,    Patient's EKG and chest x-ray are normal.  She had labs within the last 30 days which are also stable.  Patient is cleared for her thyroid surgery with you.    Marcos Field

## 2024-08-20 ENCOUNTER — TELEPHONE (OUTPATIENT)
Dept: OTOLARYNGOLOGY | Facility: CLINIC | Age: 64
End: 2024-08-20

## 2024-08-20 NOTE — TELEPHONE ENCOUNTER
Patient states she did not get CT done before her surgery tomorrow 8/21. Patient is asking if surgery has to be rescheduled or if it is okay to keep as is. Please call.     Belizean interpretor required.

## 2024-08-20 NOTE — DISCHARGE INSTRUCTIONS
CIRUGIA AMBULATORIA: INSTRUCCIONES DESPUES DE JOSEMANUEL RECIBIDO ANESTESIA  Debido a la Anestesia y a las medicamentos que se le aplicaron javon la cirugia, jess reflejos y capacidaddiscernimiento pueden verse afectados. Tambien podria tener un poco de mareo.Aparte de siguir las precauciones de sentico comun, le recomendamos lo siguiente:     El paciente debe estar acompañado por alguien hasta la mañana siguiente.     No maneje ningun vehiculo automotor ni monte bicicleta.     No tome ninguna decision importante hailey por ejemplo firmar de documentos importantes.     No opere herramientas electricas ni electrodomesticos, tales hailey cuchillos electricos, batidoras electricas o serruchos electricos. No jennie el cesped con cortadoras electricas. No practique deportes.     No robert ejercicio.     Para evitar las nauseas, coma menos de lo normal mas o menos la mitad de lo habitual y/o graciela solo liquidos hasta la manana siguiente. Consulte con ugarte doctor si esta llevando carlene dieta especial.     No tome bebidas alcoholicas, tranquilizantes, pastilles para dormir etc., y verifique con ugarte doctor acerca de cualquier medicamento que ariel tomando actualmente.     El efecto de la medicación usada en ugarte anestesia habra pasado em por completo a la medianoche. Por lo tanto, puede reanudar jess habitos cotidianos en la mañana.     Los adultos deben descansar lo aguialr posible por las siguientes 24 horas. Los niños deben permanecer en cama lo aguilar posible por las siguientes 24 horas.     Si se presenta cualquier problema, puede llamar a ugarte propio doctor personal o aceda al centro de [facility].    Si sigue estas instrucciones, se sentira major y estara mas seguro despues de ugarte cirugia ambulatoria. Sitiene cualquier pregunta, llame al hospital y pida que lo comuniquen con la enfermera de cirugia [phone], extension [phone].    Instrucciones para el suzi: después de ugarte cirugía   Acaba de  someterse a carlene cirugía. Javon la cirugía, le administraron un tipo de medicamento llamado anestesia para que esté relajado y no sienta dolor. Después de la cirugía, katja vez sienta algo de dolor o náuseas. [place] es común. Estos son algunos consejos para sentirse mejor y recuperarse zach después de la cirugía.   El regreso a casa  Ugarte proveedor de atención médica le enseñará cómo cuidarse cuando regrese a ugarte casa. También responderá jess preguntas. Pida a un familiar o amigo adulto que lo conduzca a ugarte casa. Javon las primeras 24 horas después de la cirugía, siga estas recomendaciones:   No conduzca ni use maquinaria pesada.  No tome decisiones importantes ni firme ningún documento legal.  Adminístrese los medicamentos según las indicaciones.  Evite el consumo de alcohol.  Si es necesario, coordine para que alguien se quede con usted. Esta persona puede vigilar cualquier problema que se presente y lo ayudará a permanecer seguro.  Asegúrese de asistir a todas jess visitas de control con ugarte proveedor de atención médica. Y descanse después de la cirugía javon el tiempo que le indique ugarte proveedor.   Cómo sobrellevar el dolor  Si siente dolor después de la cirugía, los analgésicos lo ayudarán a sentirse mejor. [place] los analgésicos según las indicaciones, antes de que el dolor se intensifique. Además, pregunte a ugarte proveedor de atención médica o al farmacéutico acerca de otras formas de controlar el dolor. Estas podrían incluir aplicar calor o hielo, o hacer ejercicios de relajación. Y siga todas las instrucciones que le dé ugarte cirujano o enfermero.      Cumpla el cronograma de jess medicamentos.     Consejos para fred analgésicos  Para aliviar el dolor lo paulette posible, recuerde estos puntos:   Los analgésicos pueden causar malestar estomacal. Tomarlos con un poco de comida puede aliviar ariel efecto.  La mayoría de los calmantes que se rosalba por la boca necesitan por lo menos de 20 a 30 minutos para surtir efecto.  No  espere hasta que ugarte dolor se vuelva intenso para anthony el analgésico que le indicaron. Intente que el momento en que puede anthony ugarte medicamento coincida con otra actividad. Clotilde podría ser el momento antes de vestirse, macie un paseo o sentarse a la max para cenar.  El estreñimiento es un efecto secundario frecuente de algunos analgésicos. Consulte a ugarte proveedor de atención médica antes de usar cualquier medicamento, hailey laxantes o ablandadores de heces, para ayudar a aliviar el estreñimiento. También consulte si es preciso evitar algún tipo de alimento. Anthony mucha cantidad de líquido y comer alimentos hailey frutas y verduras con alto contenido de fibra también puede ser beneficioso. Recuerde que no debe anthony laxantes a menos que ugarte cirujano se los indique.  Mezclar bebidas alcohólicas y analgésicos puede causar mareos y enlentecer ugarte respiración. Y hasta puede ser mortal. No graciela alcohol mientras esté tomando calmantes.  Los analgésicos pueden hacer que tenga reacciones más lentas. No conduzca ni opere maquinaria mientras esté tomando analgésicos.  Ugarte proveedor de atención médica puede indicarle que tome acetaminofén (paracetamol) para ayudar a aliviar el dolor. Pregúntele qué cantidad debe anthony por día. El acetaminofén y otros analgésicos pueden interactuar con jess medicamentos recetados u otros medicamentos de venta matt (OTC, por jess siglas en inglés). Algunos medicamentos recetados contienen acetaminofén y otros ingredientes. Combinar medicamentos recetados y acetaminofén de venta matt para aliviar el dolor puede provocarle carlene sobredosis accidental. Angelique atentamente la etiqueta del envase de jess medicamentos OTC. [place] lo ayudará a saber con exactitud la lista de ingredientes, la cantidad que debe anthony y cualquier advertencia. [place] también puede ayudarlo a evitar anthony demasiado acetaminofén. Si tiene preguntas o no entiende la información, pídale a ugarte farmacéutico o proveedor de atención médica que se la  explique antes de fred el medicamento OTC.   Manejo de las náuseas  Algunas personas pueden sentir malestar estomacal (náuseas) después de la cirugía. [place] suele suceder debido a la anestesia, el dolor, los analgésicos, la disminución del movimiento de la comida en el estómago o el estrés de la cirugía. Estos consejos lo ayudarán a manejar las náuseas y a comer alimentos más saludables mientras se recupera. Si seguía un plan alimentario especial antes de la cirugía, pregúntele a ugarte proveedor de atención médica si debe continuarlo mientras se recupera. Consulte con ugarte proveedor cómo debería continuar ugarte alimentación. Esta puede variar según el tipo de cirugía a la que se sometió. Los siguientes consejos generales pueden serle útiles:   No se fuerce a comer. Guíese por ugarte cuerpo para saber cuándo comer y qué cantidad.  Comience con líquidos transparentes y sopa. Estos son más fáciles de digerir.  Tan pronto hailey se sienta listo, intente comer alimentos semisólidos. Estos incluyen puré de joao, puré de manzana y gelatina.  Lentamente, pase a alimentos sólidos. Al principio no coma alimentos grasosos, pesados ni condimentados.  No se fuerce a hacer mariah comidas grandes al día. En cambio, coma cantidades pequeñas, sherrell con mayor frecuencia.  [place] los analgésicos con carlene pequeña cantidad de alimentos sólidos, hailey galletas saladas o carlene tostada. [place] ayuda a prevenir las náuseas.  Cuándo llamar a ugarte proveedor de atención médica   Llame de inmediato a ugarte proveedor de atención médica si nota alguno de los siguientes síntomas:   Sigue teniendo mucho dolor, o el dolor empeora, después de fred el medicamento. Puede que el medicamento no sea lo suficientemente corazon. O zach, puede rishi complicaciones de la cirugía.  Se siente demasiado somnoliento, mareado o adormecido. Quizás el medicamento sea demasiado corazon.  Tiene efectos secundarios, hailey náuseas o vómitos. Ugarte proveedor de atención médica puede recomendarle fred  otros medicamentos.  Tiene cambios en la piel, hailey sarpullido, picazón o urticaria. [place] puede significar que tiene carlene reacción alérgica. Ugarte proveedor puede recomendarle fred otros medicamentos.  La incisión tiene un aspecto diferente (por ejemplo, se abre carlene parte).  Tiene sangrado o supuración de líquido de la herida y no le dijeron que eso era esperable.  Fiebre de 100.4 °F (38 °C) o más, o según le indique ugarte proveedor.  Cuándo llamar al 911  Llame al  911  de inmediato si tiene:   Dificultad para respirar  [place]iji hinchada    Si tiene apnea del sueño obstructiva   Jason la cirugía, le administraron anestesia para que esté cómodo y no sienta dolor. Después de la cirugía, es probable que tenga más ataques de apnea causados por la anestesia y otros medicamentos que le administraron. Los ataques pueden durar más de lo habitual.    En ugarte casa, robert lo siguiente:  Cuando duerma, siga usando ugarte dispositivo de presión positiva continua en las vías respiratorias (CPAP, por jess siglas en inglés). A menos que ugarte proveedor de atención médica le indique lo contrario, úselo siempre que duerma, ya sea de día o de noche. El dispositivo de CPAP suele usarse para tratar la apnea obstructiva del sueño.  Consulte a ugarte proveedor antes de fred cualquier analgésico, relajante muscular o sedante. Ugarte proveedor le dará información sobre los peligros de fred estos medicamentos.  Comuníquese con ugarte proveedor si tiene el sueño demasiado alterado, incluso cuando esté tomando los medicamentos según las instrucciones.  © 0531-1029 [employer]. Todos los derechos reservados. Esta información no pretende sustituir la atención médica profesional. Sólo ugarte médico puede diagnosticar y tratar un problema de nadira.

## 2024-08-20 NOTE — TELEPHONE ENCOUNTER
Informed patient that Dr. Milner is okay without CT scan for tomorrow. OR nurses will be calling patient today with time of arrival for surgery tomorrow.     [place]ai[place]ese interpretor #[id number]

## 2024-08-21 ENCOUNTER — ANESTHESIA EVENT (OUTPATIENT)
Dept: SURGERY | Facility: HOSPITAL | Age: 64
End: 2024-08-21
Payer: MEDICAID

## 2024-08-21 ENCOUNTER — ANESTHESIA (OUTPATIENT)
Dept: SURGERY | Facility: HOSPITAL | Age: 64
End: 2024-08-21
Payer: MEDICAID

## 2024-08-21 ENCOUNTER — HOSPITAL ENCOUNTER (OUTPATIENT)
Facility: HOSPITAL | Age: 64
Setting detail: HOSPITAL OUTPATIENT SURGERY
Discharge: HOME | End: 2024-08-21
Attending: STUDENT IN AN ORGANIZED HEALTH CARE EDUCATION/TRAINING PROGRAM | Admitting: STUDENT IN AN ORGANIZED HEALTH CARE EDUCATION/TRAINING PROGRAM
Payer: MEDICAID

## 2024-08-21 VITALS
WEIGHT: 113 LBS | TEMPERATURE: 98 F | SYSTOLIC BLOOD PRESSURE: 134 MMHG | RESPIRATION RATE: 16 BRPM | DIASTOLIC BLOOD PRESSURE: 61 MMHG | HEART RATE: 71 BPM | HEIGHT: 66.93 IN | OXYGEN SATURATION: 100 % | BODY MASS INDEX: 17.74 KG/M2

## 2024-08-21 DIAGNOSIS — E04.1 THYROID NODULE: ICD-10-CM

## 2024-08-21 PROCEDURE — 60220 PARTIAL REMOVAL OF THYROID: CPT | Performed by: STUDENT IN AN ORGANIZED HEALTH CARE EDUCATION/TRAINING PROGRAM

## 2024-08-21 PROCEDURE — 60220 PARTIAL REMOVAL OF THYROID: CPT | Performed by: OTOLARYNGOLOGY

## 2024-08-21 PROCEDURE — 0GTH0ZZ RESECTION OF RIGHT THYROID GLAND LOBE, OPEN APPROACH: ICD-10-PCS | Performed by: STUDENT IN AN ORGANIZED HEALTH CARE EDUCATION/TRAINING PROGRAM

## 2024-08-21 RX ORDER — ACETAMINOPHEN 500 MG
1000 TABLET ORAL ONCE
Status: COMPLETED | OUTPATIENT
Start: 2024-08-21 | End: 2024-08-21

## 2024-08-21 RX ORDER — MORPHINE SULFATE 4 MG/ML
4 INJECTION, SOLUTION INTRAMUSCULAR; INTRAVENOUS EVERY 10 MIN PRN
Status: DISCONTINUED | OUTPATIENT
Start: 2024-08-21 | End: 2024-08-21

## 2024-08-21 RX ORDER — MORPHINE SULFATE 4 MG/ML
2 INJECTION, SOLUTION INTRAMUSCULAR; INTRAVENOUS EVERY 10 MIN PRN
Status: DISCONTINUED | OUTPATIENT
Start: 2024-08-21 | End: 2024-08-21

## 2024-08-21 RX ORDER — NALOXONE HYDROCHLORIDE 0.4 MG/ML
0.08 INJECTION, SOLUTION INTRAMUSCULAR; INTRAVENOUS; SUBCUTANEOUS AS NEEDED
Status: DISCONTINUED | OUTPATIENT
Start: 2024-08-21 | End: 2024-08-21

## 2024-08-21 RX ORDER — ONDANSETRON 2 MG/ML
4 INJECTION INTRAMUSCULAR; INTRAVENOUS EVERY 6 HOURS PRN
Status: DISCONTINUED | OUTPATIENT
Start: 2024-08-21 | End: 2024-08-21

## 2024-08-21 RX ORDER — MORPHINE SULFATE 10 MG/ML
6 INJECTION, SOLUTION INTRAMUSCULAR; INTRAVENOUS EVERY 10 MIN PRN
Status: DISCONTINUED | OUTPATIENT
Start: 2024-08-21 | End: 2024-08-21

## 2024-08-21 RX ORDER — HYDROMORPHONE HYDROCHLORIDE 1 MG/ML
0.6 INJECTION, SOLUTION INTRAMUSCULAR; INTRAVENOUS; SUBCUTANEOUS EVERY 5 MIN PRN
Status: DISCONTINUED | OUTPATIENT
Start: 2024-08-21 | End: 2024-08-21

## 2024-08-21 RX ORDER — SODIUM CHLORIDE, SODIUM LACTATE, POTASSIUM CHLORIDE, CALCIUM CHLORIDE 600; 310; 30; 20 MG/100ML; MG/100ML; MG/100ML; MG/100ML
INJECTION, SOLUTION INTRAVENOUS CONTINUOUS
Status: DISCONTINUED | OUTPATIENT
Start: 2024-08-21 | End: 2024-08-21

## 2024-08-21 RX ORDER — DEXAMETHASONE SODIUM PHOSPHATE 4 MG/ML
VIAL (ML) INJECTION AS NEEDED
Status: DISCONTINUED | OUTPATIENT
Start: 2024-08-21 | End: 2024-08-21 | Stop reason: SURG

## 2024-08-21 RX ORDER — SCOLOPAMINE TRANSDERMAL SYSTEM 1 MG/1
1 PATCH, EXTENDED RELEASE TRANSDERMAL ONCE
Status: DISCONTINUED | OUTPATIENT
Start: 2024-08-21 | End: 2024-08-21 | Stop reason: HOSPADM

## 2024-08-21 RX ORDER — HYDROMORPHONE HYDROCHLORIDE 1 MG/ML
0.4 INJECTION, SOLUTION INTRAMUSCULAR; INTRAVENOUS; SUBCUTANEOUS EVERY 5 MIN PRN
Status: DISCONTINUED | OUTPATIENT
Start: 2024-08-21 | End: 2024-08-21

## 2024-08-21 RX ORDER — LIDOCAINE HYDROCHLORIDE 10 MG/ML
INJECTION, SOLUTION EPIDURAL; INFILTRATION; INTRACAUDAL; PERINEURAL AS NEEDED
Status: DISCONTINUED | OUTPATIENT
Start: 2024-08-21 | End: 2024-08-21 | Stop reason: SURG

## 2024-08-21 RX ORDER — LIDOCAINE HYDROCHLORIDE AND EPINEPHRINE 10; 10 MG/ML; UG/ML
INJECTION, SOLUTION INFILTRATION; PERINEURAL AS NEEDED
Status: DISCONTINUED | OUTPATIENT
Start: 2024-08-21 | End: 2024-08-21 | Stop reason: HOSPADM

## 2024-08-21 RX ORDER — ROCURONIUM BROMIDE 10 MG/ML
INJECTION, SOLUTION INTRAVENOUS AS NEEDED
Status: DISCONTINUED | OUTPATIENT
Start: 2024-08-21 | End: 2024-08-21 | Stop reason: SURG

## 2024-08-21 RX ORDER — MIDAZOLAM HYDROCHLORIDE 1 MG/ML
INJECTION INTRAMUSCULAR; INTRAVENOUS AS NEEDED
Status: DISCONTINUED | OUTPATIENT
Start: 2024-08-21 | End: 2024-08-21 | Stop reason: SURG

## 2024-08-21 RX ORDER — PROCHLORPERAZINE EDISYLATE 5 MG/ML
5 INJECTION INTRAMUSCULAR; INTRAVENOUS EVERY 8 HOURS PRN
Status: DISCONTINUED | OUTPATIENT
Start: 2024-08-21 | End: 2024-08-21

## 2024-08-21 RX ORDER — HYDROCODONE BITARTRATE AND ACETAMINOPHEN 5; 325 MG/1; MG/1
1 TABLET ORAL EVERY 6 HOURS PRN
Qty: 12 TABLET | Refills: 0 | Status: SHIPPED | OUTPATIENT
Start: 2024-08-21 | End: 2024-08-24

## 2024-08-21 RX ORDER — ONDANSETRON 2 MG/ML
INJECTION INTRAMUSCULAR; INTRAVENOUS AS NEEDED
Status: DISCONTINUED | OUTPATIENT
Start: 2024-08-21 | End: 2024-08-21 | Stop reason: SURG

## 2024-08-21 RX ORDER — HYDROMORPHONE HYDROCHLORIDE 1 MG/ML
0.2 INJECTION, SOLUTION INTRAMUSCULAR; INTRAVENOUS; SUBCUTANEOUS EVERY 5 MIN PRN
Status: DISCONTINUED | OUTPATIENT
Start: 2024-08-21 | End: 2024-08-21

## 2024-08-21 RX ORDER — LIDOCAINE HYDROCHLORIDE 40 MG/ML
SOLUTION TOPICAL AS NEEDED
Status: DISCONTINUED | OUTPATIENT
Start: 2024-08-21 | End: 2024-08-21 | Stop reason: SURG

## 2024-08-21 RX ADMIN — MIDAZOLAM HYDROCHLORIDE 2 MG: 1 INJECTION INTRAMUSCULAR; INTRAVENOUS at 11:13:00

## 2024-08-21 RX ADMIN — LIDOCAINE HYDROCHLORIDE 40 MG: 10 INJECTION, SOLUTION EPIDURAL; INFILTRATION; INTRACAUDAL; PERINEURAL at 11:18:00

## 2024-08-21 RX ADMIN — SODIUM CHLORIDE, SODIUM LACTATE, POTASSIUM CHLORIDE, CALCIUM CHLORIDE: 600; 310; 30; 20 INJECTION, SOLUTION INTRAVENOUS at 11:14:00

## 2024-08-21 RX ADMIN — DEXAMETHASONE SODIUM PHOSPHATE 8 MG: 4 MG/ML VIAL (ML) INJECTION at 11:18:00

## 2024-08-21 RX ADMIN — ROCURONIUM BROMIDE 30 MG: 10 INJECTION, SOLUTION INTRAVENOUS at 11:22:00

## 2024-08-21 RX ADMIN — ONDANSETRON 4 MG: 2 INJECTION INTRAMUSCULAR; INTRAVENOUS at 11:58:00

## 2024-08-21 RX ADMIN — LIDOCAINE HYDROCHLORIDE 2 ML: 40 SOLUTION TOPICAL at 11:19:00

## 2024-08-21 NOTE — ANESTHESIA POSTPROCEDURE EVALUATION
Patient: Christine Gabriel    Procedure Summary       Date: 08/21/24 Room / Location: [facility] MAIN OR 03 / [facility] MAIN OR    Anesthesia Start: 1113 Anesthesia Stop: 1218    Procedure: Right joe thyroidectomy (Right: Neck) Diagnosis:       Thyroid nodule      (Thyroid nodule [E04.1])    Surgeons: Franki Milner MD Anesthesiologist: Ran Alvarado MD    Anesthesia Type: general ASA Status: 2            Anesthesia Type: general    Vitals Value Taken Time   /68 08/21/24 1216   Temp 98.6 08/21/24 1218   Pulse 88 08/21/24 1217   Resp 13 08/21/24 1217   SpO2 98 % 08/21/24 1217   Vitals shown include unfiled device data.    [facility] AN Post Evaluation:   Patient Evaluated in PACU  Patient Participation: complete - patient participated  Level of Consciousness: awake and alert  Pain Score: 0  Pain Management: adequate  Airway Patency:patent  Dental exam unchanged from preop  Yes    Nausea/Vomiting: none  Cardiovascular Status: acceptable  Respiratory Status: acceptable  Postoperative Hydration acceptable      Ran Alvarado MD  8/21/2024 12:18 PM

## 2024-08-21 NOTE — ANESTHESIA PROCEDURE NOTES
Airway  Date/Time: 8/21/2024 11:20 AM  Urgency: Elective    Airway not difficult    General Information and Staff    Patient location during procedure: OR  Anesthesiologist: Ran Alvarado MD  Performed: anesthesiologist   Performed by: Ran Alvarado MD  Authorized by: Ran Alvarado MD      Indications and Patient Condition  Indications for airway management: anesthesia  Sedation level: deep  Preoxygenated: yes  Patient position: sniffing  Mask difficulty assessment: 1 - vent by mask    Final Airway Details  Final airway type: endotracheal airway      Successful airway: ETT  Cuffed: yes   Successful intubation technique: direct laryngoscopy  Endotracheal tube insertion site: oral  Blade: Ky  Blade size: #3  ETT size (mm): 7.0    Cormack-Lehane Classification: grade I - full view of glottis  Placement verified by: capnometry   Measured from: teeth  ETT to teeth (cm): 19  Number of attempts at approach: 1

## 2024-08-21 NOTE — OPERATIVE REPORT
DATE OF SURGERY:  8/21/24  PREOPERATIVE DIAGNOSIS:  Right thyroid nodule  POSTOPERATIVE DIAGNOSIS: Same.  OPERATIVE PROCEDURE: Right hemithyroidectomy   SURGEON: Franki Milner M.D.  (Otolaryngology)  Assistant: Orville Valenzuela M.D. (Otolaryngology)  ANESTHESIA:  GENERAL.  Findings: Large right sided thyroid nodule  Right RLN identified and preserved    PROCEDURE: Patient was taken to the operating room and placed supine on the operating table. They underwent an uneventful intubation.  A shoulder roll was placed and they were positioned and draped in usual fashion.  A timeout was performed and all parties were in agreement.  An approximately 6 cm neck incision was marked out and a pre-existing neck crease.  5 cc of 1% lidocaine with 1 and 100,000 epinephrine was then injected into the area of the incision.  I then incised through the skin with a 15 blade through the platysma revealing the anterior cervical fascia.  We then raised superior and inferior subplatysmal flaps to the level of the sternal notch inferiorly and the laryngeal prominence superiorly.  We then found the midline raphae and open this up superiorly and inferiorly along its length.  We divided the strap musculature until we encountered the thyroid capsule.  We began on the right side to dissect the strap musculature off of the thyroid capsule.  I then began to dissect the thyroid free from investing fascia and vasculature starting at the superior pole.  Once the superior pole was freed, we then began to work more inferiorly to free the inferior pole. Following this the gland was more easily able to be delivered into the neck. Following this we rolled the gland more medially and carefully dissected the fascia and vasculature off the lateral aspect of the lobe.  We identified superior and inferior parathyroid glands in this area that we preserved and dropped off the specimen.  We continued to dissect fascia high off the gland to rule the gland medially.   The recurrent laryngeal nerve then came into view and was seen to be traveling in its usual course and not violated.  We then took care to dissect away from the nerve as we rolled the gland further medially onto the trachea.  We divided Cueva's ligament and the isthmus and delivered the right lobe out of the neck and sent for permament pathology.   We then assured hemostasis in the gutter wound bed. Valsalva  was performed which did not reveal any further bleeding. Hemostatic powder was applied. We then closed the strap musculature with a running 3-0 chromic suture.  Then closed the dep dermal layer with 3-0 chromic suture. I then closed the skin with skin glue.   Patient was handed over to anesthesia who was extubated successfully. The patient was breathing stable and transported to PACU in stable condition.   Estimated blood loss:  15cc  Implants or drains: Hemostatic powder  Specimen: Right joe thyroid  Urine output: Per anesthesia

## 2024-08-21 NOTE — H&P
[facility]  part of [facility]     History & Physical    Christine Gabriel Patient Status:  Hospital Outpatient Surgery    1960 MRN [id number]   Location [facility] PRE OP RECOVERY Attending Franki Milner MD   Hosp Day # 0 PCP Kofi Field DO     History of Present Illness:    Christine Gabriel is a(n) 63 year old female who presents today for surgery per below.     History:  Past Medical History:    Anxiety state    Basal cell carcinoma (BCC) of skin of left lower eyelid including canthus    Breast CA (HCC)    left lumpectomy    Breast cancer, left (HCC)    lumpectomy, SLNB, radiation, tamoxifen, Dr. Mark Reyes, [facility], [facility]    Colon polyps    Constipation    Depression    Disorder of thyroid    Exposure to medical diagnostic radiation    Exposure to medical therapeutic radiation    L chest    Iron deficiency anemia    Liver cyst    Liver hemangioma    Menorrhagia    endocervical curettage    PONV (postoperative nausea and vomiting)    Pregnancy (HCC)    , ,     SAB (spontaneous ) (HCC)    Thyroid nodule    Visual impairment     Past Surgical History:   Procedure Laterality Date    Aspir/inj thyroid cyst Right 2022    Breast lumpectomy Left 2010    Chemosurg mohs 1st stage Left     Colonoscopy      Colonoscopy N/A 2022    Procedure: COLONOSCOPY;  Surgeon: Yi Bourgeois MD;  Location: [facility] ENDOSCOPY    Cystoscopy,insert ureteral stent      Endocervical curettage  2010    Excision of lesion, eyelid - os - left eye Left 2014    BCCa    Hysteroscopy  2010    Per [facility]:  \"hysteroscopy - D&C.\"    Lumpectomy left            ,     Radiation left          Sent lymph node biopsy Left 2010     Family History   Problem Relation Age of Onset    Breast Cancer Self     Heart Disease Mother 88    Other (accidental death) Father 66    Breast Cancer  Sister 58    Cancer Sister         Family h/o Thyroid Cancer    Cancer Brother         Family h/o Liver Cancer    Other (oral cancer) Brother     Other (alive and well) Son         x2    Breast Cancer Maternal Cousin Female     Breast Cancer Maternal Cousin Female     Breast Cancer Paternal Cousin Female     Breast Cancer Other       reports that she has never smoked. She has never used smokeless tobacco. She reports current alcohol use. She reports that she does not use drugs.    Allergies:  Allergies   Allergen Reactions    Latex ITCHING       Home Medications:  Medications Prior to Admission   Medication Sig Dispense Refill Last Dose    mirtazapine 15 MG Oral Tab Take 1 tablet (15 mg total) by mouth nightly. For mood and to help sleep 30 tablet 1 Past Month       Physical Exam:   General: Alert, orientated x3.  Cooperative.  No apparent distress.    Pertinent exam findings may also be noted above in assessment and plan     Vital Signs:  /60 (BP Location: Right arm)   Pulse 74   Temp 98.1 °F (36.7 °C) (Oral)   Resp 16   Ht 5' 6.93\" (1.7 m)   Wt 113 lb (51.3 kg)   SpO2 100%   BMI 17.70 kg/m²   Skin Inspection - Normal.   Constitutional Overall appearance - Normal.   Head/Face Symmetric, TMJ tenderness not present    Eyes EOMI, PERRL   Right ear:  Canal clear, TM intact, no JIM   Left ear:  Canal clear, TM intact, no JIM   Nose: Septum midline, inferior turbinates not enlarged, nasal valves without collapse    Oral cavity/Oropharynx: No lesions or masses on inspection or palpation, tonsils symmetric    Neck: Soft without LAD, thyroid not enlarged  Voice clear/ no stridor       Laboratory Data:      Impression and Plan:  Patient Active Problem List   Diagnosis    Hematuria    Breast cancer of upper-outer quadrant of left female breast (HCC)    History of left breast cancer    Skin cancer    Iron deficiency anemia    Pancytopenia (HCC)    Encounter for screening mammogram for breast cancer    Periungual  wart    Vaginal discomfort    Asymptomatic microscopic hematuria    Vitamin D deficiency     1. Thyroid nodule  - CT SOFT TISSUE OF NECK(CONTRAST ONLY) (CPT=70491); Future        Recommendations:  -Given the increase in size to greater than 3 cm, an increase in the volume of the nodule by greater than 50% since 2022 and that she has 2 sisters with a reported history of thyroid cancer we will proceed with excision of this right-sided nodule.  There is an increased chance of a false negative from the FNA when the nodule is greater than 3 cm.  Discussed that if this returns as a malignancy may need to have a completion thyroidectomy, currently we will just proceed with a right-sided hemithyroidectomy.  -Will obtain a CT scan of her neck for preoperative planning  -Will obtain preoperative medical clearance     I educated the patient on the pathophysiology of their thyroid disorder. Thyroidectomy was discussed including risks, benefits and alternatives. Risks include injury to the nerves that control the vocal cords causing temporary or permanent hoarseness or respiratory distress, hypocalcemia from hypoparathyroidism, bleeding, infection, risks of general anesthesia, and need for additional procedures. The patient understands, all questions were answered and would like to proceed.        8/21/24: Patient presents for OR. No changes since clinic. Consent obtained.         Franki Milner MD  8/21/2024  10:30 AM

## 2024-08-21 NOTE — ANESTHESIA PREPROCEDURE EVALUATION
Anesthesia PreOp Note    HPI:     Christine Gabriel is a 63 year old female who presents for preoperative consultation requested by: Franki Mliner MD    Date of Surgery: 2024    Procedure(s):  Right joe thyroidectomy  Indication: Thyroid nodule [E04.1]    Relevant Problems   No relevant active problems       NPO:  Last Liquid Consumption Date: 24  Last Liquid Consumption Time:   Last Solid Consumption Date: 24  Last Solid Consumption Time:   Last Liquid Consumption Date: 24          History Review:  Patient Active Problem List    Diagnosis Date Noted    Vitamin D deficiency 2023    Vaginal discomfort 2023    Asymptomatic microscopic hematuria 2023    Periungual wart 2016    Pancytopenia (HCC) 2016    Encounter for screening mammogram for breast cancer 2016    Iron deficiency anemia 2016    History of left breast cancer 2016    Skin cancer 2016    Hematuria 2015    Breast cancer of upper-outer quadrant of left female breast (HCC) 2015       Past Medical History:    Anxiety state    Basal cell carcinoma (BCC) of skin of left lower eyelid including canthus    Breast CA (HCC)    left lumpectomy    Breast cancer, left (HCC)    lumpectomy, SLNB, radiation, tamoxifen, Dr. Mark Reyes, [facility], [facility].\"    Colon polyps    Constipation    Depression    Disorder of thyroid    Exposure to medical diagnostic radiation    Exposure to medical therapeutic radiation    L chest    Iron deficiency anemia    Liver cyst    Liver hemangioma    Menorrhagia    endocervical curettage    PONV (postoperative nausea and vomiting)    Pregnancy (HCC)    , ,     SAB (spontaneous ) (HCC)    Thyroid nodule    Visual impairment       Past Surgical History:   Procedure Laterality Date    Aspir/inj thyroid cyst Right 2022    Breast lumpectomy Left 2010    Chemosurg mohs 1st stage Left      Colonoscopy      Colonoscopy N/A 2022    Procedure: COLONOSCOPY;  Surgeon: Yi Bourgeois MD;  Location: [facility] ENDOSCOPY    Cystoscopy,insert ureteral stent      Endocervical curettage  2010    Excision of lesion, eyelid - os - left eye Left 2014    BCCa    Hysteroscopy  2010    Per [facility]:  \"hysteroscopy - D&C.\"    Lumpectomy left            ,     Radiation left          Sent lymph node biopsy Left 2010       Medications Prior to Admission   Medication Sig Dispense Refill Last Dose    mirtazapine 15 MG Oral Tab Take 1 tablet (15 mg total) by mouth nightly. For mood and to help sleep 30 tablet 1 Past Month     Current Facility-Administered Medications Ordered in Epic   Medication Dose Route Frequency Provider Last Rate Last Admin    lactated ringers infusion   Intravenous Continuous Franki Milner MD 20 mL/hr at 24 0842 New Bag at 24 0842     No current [facility]-ordered outpatient medications on file.       Allergies   Allergen Reactions    Latex ITCHING       Family History   Problem Relation Age of Onset    Breast Cancer Self     Heart Disease Mother 88    Other (accidental death) Father 66    Breast Cancer Sister 58    Cancer Sister         Family h/o Thyroid Cancer    Cancer Brother         Family h/o Liver Cancer    Other (oral cancer) Brother     Other (alive and well) Son         x2    Breast Cancer Maternal Cousin Female     Breast Cancer Maternal Cousin Female     Breast Cancer Paternal Cousin Female     Breast Cancer Other      Social History     Socioeconomic History    Marital status:     Number of children: 2   Occupational History    Occupation: homemaker   Tobacco Use    Smoking status: Never    Smokeless tobacco: Never   Vaping Use    Vaping status: Never Used   Substance and Sexual Activity    Alcohol use: Yes     Alcohol/week: 0.0 standard drinks of alcohol     Comment: seldom    Drug use: Never   Other Topics Concern     Caffeine Concern No    Occupational Exposure No    Pt has a pacemaker No    Pt has a defibrillator No    Reaction to local anesthetic No       Available pre-op labs reviewed.  Lab Results   Component Value Date    WBC 6.4 07/31/2024    RBC 4.06 07/31/2024    HGB 12.1 07/31/2024    HCT 36.2 07/31/2024    MCV 89.2 07/31/2024    MCH 29.8 07/31/2024    MCHC 33.4 07/31/2024    RDW 12.2 07/31/2024    .0 07/31/2024     Lab Results   Component Value Date     07/31/2024    K 5.1 07/31/2024     07/31/2024    CO2 28.0 07/31/2024    BUN 15 07/31/2024    CREATSERUM 0.78 07/31/2024    GLU 95 07/31/2024    CA 9.7 07/31/2024          Vital Signs:  Body mass index is 17.7 kg/m².   height is 1.7 m (5' 6.93\") and weight is 51.3 kg (113 lb). Her oral temperature is 98.1 °F (36.7 °C). Her blood pressure is 126/60 and her pulse is 74. Her respiration is 16 and oxygen saturation is 100%.   Vitals:    08/08/24 1601 08/21/24 0915   BP:  126/60   Pulse:  74   Resp:  16   Temp:  98.1 °F (36.7 °C)   TempSrc:  Oral   SpO2:  100%   Weight: 49.9 kg (110 lb) 51.3 kg (113 lb)   Height: 1.7 m (5' 6.93\")         Anesthesia Evaluation     Patient summary reviewed and Nursing notes reviewed    History of anesthetic complications (ponv)   Airway   Mallampati: II  TM distance: <3 FB  Neck ROM: full  Dental - Dentition appears grossly intact     Pulmonary - normal exam   Cardiovascular - negative ROS and normal exam  Exercise tolerance: good    Neuro/Psych    (+)  anxiety/panic attacks,        GI/Hepatic/Renal      Endo/Other      Comments: Thyroid nodule, 3.5cm and growing, biopsy negative but strong FH for thyroid cancer  Breast cancer s/p l lumpectomy  Abdominal                  Anesthesia Plan:   ASA:  2  Plan:   General  Airway:  ETT  Post-op Pain Management: Oral pain medication and IV analgesics  Informed Consent Plan and Risks Discussed With:  Patient,  and spouse      I have informed Christine Gabriel and/or  legal guardian or family member of the nature of the anesthetic plan, benefits, risks including possible dental damage if relevant, major complications, and any alternative forms of anesthetic management.   All of the patient's questions were answered to the best of my ability. The patient desires the anesthetic management as planned.  Ran Alvarado MD  8/21/2024 9:28 AM  Present on Admission:  **None**

## 2024-08-21 NOTE — BRIEF OP NOTE
Pre-Operative Diagnosis: Thyroid nodule [E04.1]     Post-Operative Diagnosis: Thyroid nodule [E04.1]      Procedure Performed:   Right joe thyroidectomy    Surgeons and Role:     * Franki Milner MD - Primary     * Orville Valenzuela MD - Assisting Surgeon    Assistant(s):        Surgical Findings: Large right sided thyroid nodule  Right RLN identified and preserved     Specimen: Right thyroid lobe     Estimated Blood Loss: No data recorded    Dictation Number:      Franki Milner MD  8/21/2024  12:08 PM

## 2024-08-22 ENCOUNTER — TELEPHONE (OUTPATIENT)
Dept: OTOLARYNGOLOGY | Facility: CLINIC | Age: 64
End: 2024-08-22

## 2024-08-22 NOTE — TELEPHONE ENCOUNTER
Post op day 1 - Right joe thyroidectomy     Dr. Milner with Dr. Valenzuela assist.    Using  [facility] #[id number] called patient.    Patient doing better, not in much pain, hasn't taken Norco yet, would rather take Tylenol first. She asked if she was supposed to have an ABX, she wasn't prescribed one (will check with Dr. Milner). She said it's sore to eat, what should she eat. I advised soft foods for now that are easy to swallow and don't require a lot of chewing or work.  Pierron foods if she has any nausea.     Dr. Milner did not prescribe an ABX for Meagan, sent msg to her.    Medications: Norco    No Drain, Hemostat powder used    Can bathe/shower but keep dressing dry    Pt to contact our office if any fever greater than 102, pt experiencing numbness or tingling of face or hands, severe pain, uncontrolled bleeding, pt verbalized understanding.       Post op appt: Aug 29 at 1:30 PM

## 2024-08-29 ENCOUNTER — OFFICE VISIT (OUTPATIENT)
Dept: OTOLARYNGOLOGY | Facility: CLINIC | Age: 64
End: 2024-08-29

## 2024-08-29 DIAGNOSIS — E04.1 THYROID NODULE: Primary | ICD-10-CM

## 2024-08-29 PROCEDURE — 99213 OFFICE O/P EST LOW 20 MIN: CPT | Performed by: STUDENT IN AN ORGANIZED HEALTH CARE EDUCATION/TRAINING PROGRAM

## 2024-08-29 PROCEDURE — 10021 FNA BX W/O IMG GDN 1ST LES: CPT | Performed by: STUDENT IN AN ORGANIZED HEALTH CARE EDUCATION/TRAINING PROGRAM

## 2024-08-29 NOTE — PROGRESS NOTES
Christine Gabriel is a 63 year old female. No chief complaint on file.    HPI:   63-year-old status post lobectomy for dominant right-sided thyroid nodule.  Pathology with a 3.3 cm adenomatous nodule without any evidence of malignancy.  Doing well other than slight neck swelling    Current Outpatient Medications   Medication Sig Dispense Refill    mirtazapine 15 MG Oral Tab Take 1 tablet (15 mg total) by mouth nightly. For mood and to help sleep 30 tablet 1      Past Medical History:    Anxiety state    Basal cell carcinoma (BCC) of skin of left lower eyelid including canthus    Breast CA (HCC)    left lumpectomy    Breast cancer, left (HCC)    lumpectomy, SLNB, radiation, tamoxifen, Dr. Mark Reyes, [facility], [facility].\"    Colon polyps    Constipation    Depression    Disorder of thyroid    Exposure to medical diagnostic radiation    Exposure to medical therapeutic radiation    L chest    Iron deficiency anemia    Liver cyst    Liver hemangioma    Menorrhagia    endocervical curettage    PONV (postoperative nausea and vomiting)    Pregnancy (HCC)    , ,     SAB (spontaneous ) (HCC)    Thyroid nodule    Visual impairment      Social History:  Social History     Socioeconomic History    Marital status:     Number of children: 2   Occupational History    Occupation: homemaker   Tobacco Use    Smoking status: Never    Smokeless tobacco: Never   Vaping Use    Vaping status: Never Used   Substance and Sexual Activity    Alcohol use: Yes     Alcohol/week: 0.0 standard drinks of alcohol     Comment: seldom    Drug use: Never   Other Topics Concern    Caffeine Concern No    Occupational Exposure No    Pt has a pacemaker No    Pt has a defibrillator No    Reaction to local anesthetic No   Social History Narrative    ** Merged History Encounter **           Past Surgical History:   Procedure Laterality Date    Aspir/inj thyroid cyst Right 2022    Breast  lumpectomy Left 2010    Chemosurg mohs 1st stage Left     Colonoscopy      Colonoscopy N/A 2022    Procedure: COLONOSCOPY;  Surgeon: Yi Bourgeois MD;  Location: [facility] ENDOSCOPY    Cystoscopy,insert ureteral stent      Endocervical curettage  2010    Excision of lesion, eyelid - os - left eye Left 2014    BCCa    Hysteroscopy  2010    Per [employer]:  \"hysteroscopy - D&C.\"    Lumpectomy left            ,     Radiation left          Sent lymph node biopsy Left 2010         EXAM:   There were no vitals taken for this visit.    System Details   Skin Inspection - Normal.   Constitutional Overall appearance - Normal.   Head/Face Symmetric, TMJ tenderness not present    Eyes EOMI, PERRL   Right ear:  Canal clear, TM intact, no JIM   Left ear:  Canal clear, TM intact, no JIM   Nose: Septum midline, inferior turbinates not enlarged, nasal valves without collapse    Oral cavity/Oropharynx: No lesions or masses on inspection or palpation, tonsils symmetric    Neck: Soft without LAD, thyroid not enlarged  Voice clear/ no stridor  Incision healing well  Anterior neck seroma   Other:      SCOPES AND PROCEDURES:     Procedure.  Fine-needle aspiration of anterior neck seroma.  Consent obtained.  The area was anesthetized with local anesthetic.  18-gauge needle was inserted into the anterior neck fluid pocket.  Approximately 10 cc of brownish colored fluid withdrawn.  The patient tolerated this well    AUDIOGRAM AND IMAGING:         IMPRESSION:   1. Thyroid nodule       Recommendations:  -Doing well status post thyroid lobectomy with benign pathology.  -Anterior neck seroma drained today and discussed that this can recur and the residual edema will resolve with time and should return if this swells up a lot again  -Discussed incisional and postprocedural care and return precautions    The patient indicates understanding of these issues and agrees to the plan.      Franki  MD Alf  8/29/2024  1:14 PM

## 2024-09-01 ENCOUNTER — HOSPITAL ENCOUNTER (OUTPATIENT)
Age: 64
Discharge: HOME | End: 2024-09-01
Payer: MEDICAID

## 2024-09-01 VITALS
HEART RATE: 81 BPM | TEMPERATURE: 97 F | RESPIRATION RATE: 20 BRPM | OXYGEN SATURATION: 98 % | SYSTOLIC BLOOD PRESSURE: 132 MMHG | DIASTOLIC BLOOD PRESSURE: 80 MMHG

## 2024-09-01 DIAGNOSIS — T81.41XA INFECTION OF SUPERFICIAL INCISIONAL SURGICAL SITE AFTER PROCEDURE, INITIAL ENCOUNTER: Primary | ICD-10-CM

## 2024-09-01 RX ORDER — CLINDAMYCIN HCL 300 MG
300 CAPSULE ORAL 3 TIMES DAILY
Qty: 30 CAPSULE | Refills: 0 | Status: SHIPPED | OUTPATIENT
Start: 2024-09-01 | End: 2024-09-11

## 2024-09-01 NOTE — ED PROVIDER NOTES
Patient Seen in: [facility]      History     Chief Complaint   Patient presents with    Rash Skin Problem     Stated Complaint: Incision Check  Subjective:   63-year-old female with anemia, breast cancer in remission, depression, anxiety presents from home.  Patient is here for a wound check.  She had a nodule removed from her thyroid .  States that she cleaned the area with a Q-tip today and a small amount of pus was on the Q-tip.  States clear fluid.  No gross drainage.  No bleeding.  No pain.  No fever.  No difficulty swallowing.  Does note that she had a follow-up appointment with her surgeon 3 days ago and had fluid drained from the area.    The history is provided by the patient and the spouse. A  was used.     Objective:   Past Medical History:    Anxiety state    Basal cell carcinoma (BCC) of skin of left lower eyelid including canthus    Breast CA (HCC)    left lumpectomy    Breast cancer, left (HCC)    lumpectomy, SLNB, radiation, tamoxifen, Dr. Mark Reyes, [facility], [facility].\"    Colon polyps    Constipation    Depression    Disorder of thyroid    Exposure to medical diagnostic radiation    Exposure to medical therapeutic radiation    L chest    Iron deficiency anemia    Liver cyst    Liver hemangioma    Menorrhagia    endocervical curettage    PONV (postoperative nausea and vomiting)    Pregnancy (HCC)    , ,     SAB (spontaneous ) (HCC)    Thyroid nodule    Visual impairment            Past Surgical History:   Procedure Laterality Date    Aspir/inj thyroid cyst Right 2022    Breast lumpectomy Left 2010    Chemosurg mohs 1st stage Left     Colonoscopy      Colonoscopy N/A 2022    Procedure: COLONOSCOPY;  Surgeon: Yi Bourgeois MD;  Location: [facility] ENDOSCOPY    Cystoscopy,insert ureteral stent      Endocervical curettage  2010    Excision of lesion, eyelid - os - left eye Left 2014     BCCa    Hysteroscopy  2010    Per [facility]:  \"hysteroscopy - D&C.\"    Lumpectomy left            ,     Radiation left          Sent lymph node biopsy Left 2010              Social History     Socioeconomic History    Marital status:     Number of children: 2   Occupational History    Occupation: homemaker   Tobacco Use    Smoking status: Never    Smokeless tobacco: Never   Vaping Use    Vaping status: Never Used   Substance and Sexual Activity    Alcohol use: Yes     Alcohol/week: 0.0 standard drinks of alcohol     Comment: seldom    Drug use: Never   Other Topics Concern    Caffeine Concern No    Occupational Exposure No    Pt has a pacemaker No    Pt has a defibrillator No    Reaction to local anesthetic No   Social History Narrative    ** Merged History Encounter **                 Review of Systems    Positive for stated complaint: Rash Skin Problem    Other systems are as noted in HPI.  Constitutional and vital signs reviewed.      All other systems reviewed and negative except as noted above.    Physical Exam     ED Triage Vitals [24 1309]   /80   Pulse 81   Resp 20   Temp 96.9 °F (36.1 °C)   Temp src Temporal   SpO2 98 %   O2 Device None (Room air)     Current:/80   Pulse 81   Temp 96.9 °F (36.1 °C) (Temporal)   Resp 20   SpO2 98%     Physical Exam  Vitals and nursing note reviewed.   Constitutional:       General: She is not in acute distress.     Appearance: Normal appearance. She is not ill-appearing or toxic-appearing.   HENT:      Head: Normocephalic and atraumatic.      Nose: Nose normal.      Mouth/Throat:      Mouth: Mucous membranes are moist.      Pharynx: Oropharynx is clear.   Eyes:      Pupils: Pupils are equal, round, and reactive to light.   Neck:      Comments: 3.5 cm linear horizontal incision to midline anterior neck.  Skin adhesive in place.  Generalized swelling.  No fluctuance.  No tenderness.  No erythema.  No drainage.  No  wound dehiscence  See media tab for attached picture  Cardiovascular:      Rate and Rhythm: Normal rate and regular rhythm.      Pulses: Normal pulses.   Pulmonary:      Effort: Pulmonary effort is normal. No respiratory distress.      Breath sounds: Normal breath sounds.      Comments: Lungs clear.  No adventitious lung sounds.  No distress.  No hypoxia.  Pulse ox 98% ra. Which is normal    Abdominal:      General: Abdomen is flat.      Palpations: Abdomen is soft.   Musculoskeletal:         General: No signs of injury. Normal range of motion.      Cervical back: Normal range of motion and neck supple. No pain with movement. Normal range of motion.   Skin:     General: Skin is warm and dry.      Capillary Refill: Capillary refill takes less than 2 seconds.   Neurological:      General: No focal deficit present.      Mental Status: She is alert and oriented to person, place, and time.      GCS: GCS eye subscore is 4. GCS verbal subscore is 5. GCS motor subscore is 6.   Psychiatric:         Mood and Affect: Mood normal.         Behavior: Behavior normal.         Thought Content: Thought content normal.         Judgment: Judgment normal.         ED Course   Radiology:  No results found.  Labs Reviewed - No data to display    MDM     Medical Decision Making  Differential diagnoses reflecting the complexity of care include: Postop wound check, postop infection, seroma  Patient is 10 days postop from thyroid lobectomy.  She has had follow-up with ENT and had a seroma drained.  Here today because she had a scant amount of clear drainage on a Q-tip when cleaning the wound.  Wound appears to be healing well.  Skin adhesive is in place.  No significant erythema.  No dehiscence.  No drainage.  There is localized swelling but no fluctuance, no significant seroma.  She has no pain with swallowing.  No difficulty swallowing.  No difficulty with range of motion of the neck.  No respiratory distress.  Well-appearing  I was able to  consult with Dr. Lemons, covering from Dr. Milner for ENT.  Recommends starting the patient on antibiotics (Clindamycin), they will follow in the clinic    Plan of Care: Clindamycin.  Clean the area with soap and water.  Follow-up in clinic.  Stressed the patient that she should go to the emergency room if increased swelling, fever, drainage, difficulty moving neck or swallowing    Results and plan of care discussed with the patient/family. They are in agreement with discharge. They understand to follow up with their primary doctor or the referral physician for further evaluation, especially if no improvement.  Also discussed the limitations of immediate care, patient is aware that if symptoms are worse they should go to the emergency room. Verbal and written discharge instructions were given.     My independent interpretation of studies of: N/A  Diagnostic tests and medications considered but not ordered were: Wound culture, imaging  Shared decision making was done by: Patient agreeable to starting antibiotics  Comorbidities that add complexity to management include: Anemia, depression, anxiety  External chart review was done and was noted: Thyroid lobectomy 8/21, negative for malignancy, saw Dr. Milner on Thursday and had seroma aspirated  History obtained by an independent source was from:   Discussions and management was done with: Dr. Lemons, covering for Dr. Milner ENT  Social determinants of health that affect care: Language barrier              Problems Addressed:  Infection of superficial incisional surgical site after procedure, initial encounter: acute illness or injury    Amount and/or Complexity of Data Reviewed  Independent Historian: spouse  External Data Reviewed: notes.    Risk  OTC drugs.  Prescription drug management.        Disposition and Plan     Clinical Impression:  1. Infection of superficial incisional surgical site after procedure, initial encounter          Disposition:  Discharge  9/1/2024  1:51 pm    Follow-up:  Franki Milner MD  [address] [place] [address]  [phone]    Call             Medications Prescribed:  Discharge Medication List as of 9/1/2024  1:55 PM        START taking these medications    Details   clindamycin 300 MG Oral Cap Take 1 capsule (300 mg total) by mouth 3 (three) times daily for 10 days., Normal, Disp-30 capsule, R-0

## 2024-09-01 NOTE — ED INITIAL ASSESSMENT (HPI)
Incision check to neck.  Patient had thyroid surgery on 8/21 at [facility].  Denies fever.

## 2024-09-01 NOTE — DISCHARGE INSTRUCTIONS
Limpiar la rupesh con agua y jabón.  [place] clindamicina 3 veces al día hasta que se acabe.  Consulte al Dr. Milner esta semana para un chequeo de la herida; llame el ever para programarlo.  Vaya a la vicente de emergencias si los síntomas empeoran, secreción o hinchazón significativa, aumento del enrojecimiento, fiebre, dificultad para  el maribel.      Clean the area with soap and water.  Take clindamycin 3 times a day until gone.  See Dr. Milner this week for a wound check, call on Tuesday to schedule this.  Go to the emergency room if worsening symptoms, significant drainage or swelling, increased redness, fever, difficulty moving your neck

## 2024-09-03 ENCOUNTER — OFFICE VISIT (OUTPATIENT)
Dept: OTOLARYNGOLOGY | Facility: CLINIC | Age: 64
End: 2024-09-03

## 2024-09-03 DIAGNOSIS — R22.1 NECK SWELLING: Primary | ICD-10-CM

## 2024-09-03 PROCEDURE — 99024 POSTOP FOLLOW-UP VISIT: CPT | Performed by: STUDENT IN AN ORGANIZED HEALTH CARE EDUCATION/TRAINING PROGRAM

## 2024-09-03 PROCEDURE — 10140 I&D HMTMA SEROMA/FLUID COLLJ: CPT | Performed by: STUDENT IN AN ORGANIZED HEALTH CARE EDUCATION/TRAINING PROGRAM

## 2024-09-03 NOTE — PROGRESS NOTES
Christine Gabriel is a 63 year old female.   Chief Complaint   Patient presents with    Follow - Up     thyroid lobectomy, Surgical site is draining and inflamed      HPI:   63-year-old in follow-up after thyroid lobectomy with recurrent seroma.  On clindamycin    Current Outpatient Medications   Medication Sig Dispense Refill    clindamycin 300 MG Oral Cap Take 1 capsule (300 mg total) by mouth 3 (three) times daily for 10 days. 30 capsule 0    mirtazapine 15 MG Oral Tab Take 1 tablet (15 mg total) by mouth nightly. For mood and to help sleep 30 tablet 1      Past Medical History:    Anxiety state    Basal cell carcinoma (BCC) of skin of left lower eyelid including canthus    Breast CA (HCC)    left lumpectomy    Breast cancer, left (HCC)    lumpectomy, SLNB, radiation, tamoxifen, Dr. Mark Reyes, [facility], [facility].\"    Colon polyps    Constipation    Depression    Disorder of thyroid    Exposure to medical diagnostic radiation    Exposure to medical therapeutic radiation    L chest    Iron deficiency anemia    Liver cyst    Liver hemangioma    Menorrhagia    endocervical curettage    PONV (postoperative nausea and vomiting)    Pregnancy (HCC)    , ,     SAB (spontaneous ) (HCC)    Thyroid nodule    Visual impairment      Social History:  Social History     Socioeconomic History    Marital status:     Number of children: 2   Occupational History    Occupation: homemaker   Tobacco Use    Smoking status: Never    Smokeless tobacco: Never   Vaping Use    Vaping status: Never Used   Substance and Sexual Activity    Alcohol use: Yes     Alcohol/week: 0.0 standard drinks of alcohol     Comment: seldom    Drug use: Never   Other Topics Concern    Caffeine Concern No    Occupational Exposure No    Pt has a pacemaker No    Pt has a defibrillator No    Reaction to local anesthetic No   Social History Narrative    ** Merged History Encounter **           Past  Surgical History:   Procedure Laterality Date    Aspir/inj thyroid cyst Right 2022    Breast lumpectomy Left 2010    Chemosurg mohs 1st stage Left     Colonoscopy      Colonoscopy N/A 2022    Procedure: COLONOSCOPY;  Surgeon: Yi Bourgeois MD;  Location: [facility] ENDOSCOPY    Cystoscopy,insert ureteral stent      Endocervical curettage  2010    Excision of lesion, eyelid - os - left eye Left 2014    BCCa    Hysteroscopy  2010    Per [facility]:  \"hysteroscopy - D&C.\"    Lumpectomy left            ,     Radiation left          Sent lymph node biopsy Left 2010         EXAM:   There were no vitals taken for this visit.    System Details   Skin Inspection - Normal.   Constitutional Overall appearance - Normal.   Head/Face Symmetric, TMJ tenderness not present    Eyes EOMI, PERRL   Right ear:  Canal clear, TM intact, no JIM   Left ear:  Canal clear, TM intact, no JIM   Nose: Septum midline, inferior turbinates not enlarged, nasal valves without collapse    Oral cavity/Oropharynx: No lesions or masses on inspection or palpation, tonsils symmetric    Neck: Small drainage hole in the mid incision.  Seroma reaccumulated  Voice clear/ no stridor   Other:      SCOPES AND PROCEDURES:     Procedure.  Incision and drainage of neck seroma.  Consent was obtained.  The area of drainage was anesthetized with local anesthetic.  This was opened up with a small stab incision with a 15 blade.  The seroma was drained and 1/4 inch Penrose drain was inserted and sutured in place.  Local dressing was applied.  The patient tolerated well    AUDIOGRAM AND IMAGING:         IMPRESSION:   1. Neck swelling       Recommendations:  -Penrose drain placed today.  Discussed drain care and she is going to return next week to see if we can remove the drain.  She will continue the clindamycin.    The patient indicates understanding of these issues and agrees to the plan.      Franki Milner  MD  9/3/2024  1:33 PM

## 2024-09-10 ENCOUNTER — OFFICE VISIT (OUTPATIENT)
Dept: OTOLARYNGOLOGY | Facility: CLINIC | Age: 64
End: 2024-09-10

## 2024-09-10 DIAGNOSIS — R22.1 NECK SWELLING: Primary | ICD-10-CM

## 2024-09-10 PROCEDURE — 99024 POSTOP FOLLOW-UP VISIT: CPT | Performed by: STUDENT IN AN ORGANIZED HEALTH CARE EDUCATION/TRAINING PROGRAM

## 2024-09-10 NOTE — PROGRESS NOTES
Christine Gabriel is a 63 year old female.   Chief Complaint   Patient presents with    Post-Op     Post-op right joe-thyroidectomy  Pt reports swelling and fluid     HPI:   63-year-old presents in follow-up regarding her seroma.  For the most part has stopped draining through the Penrose    Current Outpatient Medications   Medication Sig Dispense Refill    clindamycin 300 MG Oral Cap Take 1 capsule (300 mg total) by mouth 3 (three) times daily for 10 days. 30 capsule 0    mirtazapine 15 MG Oral Tab Take 1 tablet (15 mg total) by mouth nightly. For mood and to help sleep 30 tablet 1      Past Medical History:    Anxiety state    Basal cell carcinoma (BCC) of skin of left lower eyelid including canthus    Breast CA (HCC)    left lumpectomy    Breast cancer, left (HCC)    lumpectomy, SLNB, radiation, tamoxifen, Dr. Mark Reyes, [facility], [facility].\"    Colon polyps    Constipation    Depression    Disorder of thyroid    Exposure to medical diagnostic radiation    Exposure to medical therapeutic radiation    L chest    Iron deficiency anemia    Liver cyst    Liver hemangioma    Menorrhagia    endocervical curettage    PONV (postoperative nausea and vomiting)    Pregnancy (HCC)    , ,     SAB (spontaneous ) (HCC)    Thyroid nodule    Visual impairment      Social History:  Social History     Socioeconomic History    Marital status:     Number of children: 2   Occupational History    Occupation: homemaker   Tobacco Use    Smoking status: Never    Smokeless tobacco: Never   Vaping Use    Vaping status: Never Used   Substance and Sexual Activity    Alcohol use: Yes     Alcohol/week: 0.0 standard drinks of alcohol     Comment: seldom    Drug use: Never   Other Topics Concern    Caffeine Concern No    Occupational Exposure No    Pt has a pacemaker No    Pt has a defibrillator No    Reaction to local anesthetic No   Social History Narrative    ** Merged History  Encounter **           Past Surgical History:   Procedure Laterality Date    Aspir/inj thyroid cyst Right 2022    Breast lumpectomy Left 2010    Chemosurg mohs 1st stage Left     Colonoscopy      Colonoscopy N/A 2022    Procedure: COLONOSCOPY;  Surgeon: Yi Bourgeois MD;  Location: [facility] ENDOSCOPY    Cystoscopy,insert ureteral stent      Endocervical curettage  2010    Excision of lesion, eyelid - os - left eye Left 2014    BCCa    Hysteroscopy  2010    Per [facility]:  \"hysteroscopy - D&C.\"    Lumpectomy left            ,     Radiation left          Sent lymph node biopsy Left 2010         EXAM:   There were no vitals taken for this visit.    System Details   Skin Inspection - Normal.   Constitutional Overall appearance - Normal.   Head/Face Symmetric, TMJ tenderness not present    Eyes EOMI, PERRL   Right ear:  Canal clear, TM intact, no JIM   Left ear:  Canal clear, TM intact, no JIM   Nose: Septum midline, inferior turbinates not enlarged, nasal valves without collapse    Oral cavity/Oropharynx: No lesions or masses on inspection or palpation, tonsils symmetric    Neck: Soft without LAD, there is minimal drainage on the gauze the Penrose  was removed and 2 simple stitches with 2-0 nylon were placed to approximate the drain opening while leaving still a small gap to allow additional drainage to occur  Voice clear/ no stridor   Other:      SCOPES AND PROCEDURES:         AUDIOGRAM AND IMAGING:         IMPRESSION:   1. Neck swelling       Recommendations:  -Drain removed and the opening partially closed to allow for additional drainage.  Will bring her back in 1 to 2 weeks to remove the sutures    The patient indicates understanding of these issues and agrees to the plan.      Franki Milner MD  9/10/2024  11:14 AM

## 2024-09-23 ENCOUNTER — OFFICE VISIT (OUTPATIENT)
Dept: OTOLARYNGOLOGY | Facility: CLINIC | Age: 64
End: 2024-09-23

## 2024-09-23 VITALS — WEIGHT: 113 LBS | BODY MASS INDEX: 18.16 KG/M2 | HEIGHT: 66 IN

## 2024-09-23 DIAGNOSIS — R22.1 NECK SWELLING: Primary | ICD-10-CM

## 2024-09-23 PROCEDURE — 99024 POSTOP FOLLOW-UP VISIT: CPT | Performed by: STUDENT IN AN ORGANIZED HEALTH CARE EDUCATION/TRAINING PROGRAM

## 2024-09-23 NOTE — PROGRESS NOTES
Christine Gabriel is a 63 year old female.   Chief Complaint   Patient presents with    Post-Op     Patient is here for  right joe-thyroidectomy post op.     HPI:   No more drainage or neck swelling doing well    Current Outpatient Medications   Medication Sig Dispense Refill    mirtazapine 15 MG Oral Tab Take 1 tablet (15 mg total) by mouth nightly. For mood and to help sleep (Patient not taking: Reported on 2024) 30 tablet 1      Past Medical History:    Anxiety state    Basal cell carcinoma (BCC) of skin of left lower eyelid including canthus    Breast CA (HCC)    left lumpectomy    Breast cancer, left (HCC)    lumpectomy, SLNB, radiation, tamoxifen, Dr. Mark Reyes, [facility], [facility].\"    Colon polyps    Constipation    Depression    Disorder of thyroid    Exposure to medical diagnostic radiation    Exposure to medical therapeutic radiation    L chest    Iron deficiency anemia    Liver cyst    Liver hemangioma    Menorrhagia    endocervical curettage    PONV (postoperative nausea and vomiting)    Pregnancy (HCC)    , ,     SAB (spontaneous ) (HCC)    Thyroid nodule    Visual impairment      Social History:  Social History     Socioeconomic History    Marital status:     Number of children: 2   Occupational History    Occupation: homemaker   Tobacco Use    Smoking status: Never    Smokeless tobacco: Never   Vaping Use    Vaping status: Never Used   Substance and Sexual Activity    Alcohol use: Yes     Alcohol/week: 0.0 standard drinks of alcohol     Comment: seldom    Drug use: Never   Other Topics Concern    Caffeine Concern No    Occupational Exposure No    Pt has a pacemaker No    Pt has a defibrillator No    Reaction to local anesthetic No   Social History Narrative    ** Merged History Encounter **           Past Surgical History:   Procedure Laterality Date    Aspir/inj thyroid cyst Right 2022    Breast lumpectomy Left 2010     Chemosurg mohs 1st stage Left     Colonoscopy      Colonoscopy N/A 2022    Procedure: COLONOSCOPY;  Surgeon: Yi Bourgeois MD;  Location: [facility] ENDOSCOPY    Cystoscopy,insert ureteral stent      Endocervical curettage  2010    Excision of lesion, eyelid - os - left eye Left 2014    BCCa    Hysteroscopy  2010    Per [facility]:  \"hysteroscopy - D&C.\"    Lumpectomy left            ,     Radiation left          Sent lymph node biopsy Left 2010         EXAM:   Ht 5' 6\" (1.676 m)   Wt 113 lb (51.3 kg)   BMI 18.24 kg/m²     System Details   Skin Inspection - Normal.   Constitutional Overall appearance - Normal.   Head/Face Symmetric, TMJ tenderness not present    Eyes EOMI, PERRL   Right ear:  Canal clear, TM intact, no JIM   Left ear:  Canal clear, TM intact, no JIM   Nose: Septum midline, inferior turbinates not enlarged, nasal valves without collapse    Oral cavity/Oropharynx: No lesions or masses on inspection or palpation, tonsils symmetric    Neck: Soft without LAD, neck is soft with no reaccumulation of fluid.  Incision is healing well.  The previously placed sutures x 2 were removed today  Voice clear/ no stridor   Other:      SCOPES AND PROCEDURES:         AUDIOGRAM AND IMAGING:         IMPRESSION:   1. Neck swelling       Recommendations:  -Sutures removed and the incision is healing well.  No reaccumulation of the seroma.  -Discussed wound cares and return precautions     The patient indicates understanding of these issues and agrees to the plan.      Franki Milner MD  2024  12:39 PM

## 2024-11-04 ENCOUNTER — OFFICE VISIT (OUTPATIENT)
Dept: FAMILY MEDICINE CLINIC | Facility: CLINIC | Age: 64
End: 2024-11-04

## 2024-11-04 VITALS
RESPIRATION RATE: 17 BRPM | WEIGHT: 119 LBS | HEART RATE: 92 BPM | BODY MASS INDEX: 19.13 KG/M2 | SYSTOLIC BLOOD PRESSURE: 116 MMHG | TEMPERATURE: 101 F | HEIGHT: 66 IN | DIASTOLIC BLOOD PRESSURE: 66 MMHG

## 2024-11-04 DIAGNOSIS — J02.9 SORE THROAT: Primary | ICD-10-CM

## 2024-11-04 LAB
CONTROL LINE PRESENT WITH A CLEAR BACKGROUND (YES/NO): YES YES/NO
KIT LOT #: NORMAL NUMERIC
STREP GRP A CUL-SCR: POSITIVE

## 2024-11-04 PROCEDURE — 99213 OFFICE O/P EST LOW 20 MIN: CPT | Performed by: FAMILY MEDICINE

## 2024-11-04 PROCEDURE — 87880 STREP A ASSAY W/OPTIC: CPT | Performed by: FAMILY MEDICINE

## 2024-11-04 RX ORDER — PREDNISONE 10 MG/1
10 TABLET ORAL 3 TIMES DAILY
Qty: 6 TABLET | Refills: 0 | Status: SHIPPED | OUTPATIENT
Start: 2024-11-04

## 2024-11-04 RX ORDER — PENICILLIN V POTASSIUM 500 MG/1
500 TABLET, FILM COATED ORAL 3 TIMES DAILY
Qty: 30 TABLET | Refills: 0 | Status: SHIPPED | OUTPATIENT
Start: 2024-11-04

## 2024-11-04 NOTE — PROGRESS NOTES
Blood pressure 116/66, pulse 92, temperature (!) 100.7 °F (38.2 °C), temperature source Temporal, resp. rate 17, height 5' 6\" (1.676 m), weight 119 lb (54 kg), not currently breastfeeding.          2 days of throat pain.  Some ear pain no cough.  No nasal congestion.  Taking only vitamins daily.    Objective    Patient comfortable no apparent distress    Left ear with TM intact right ear ceruminous    Throat with tonsils erythematous and injected with exudate    Rapid strep positive    Assessment strep pharyngitis    Plan penicillin and prednisone    Follow-up if no improvement

## 2025-04-03 NOTE — PROGRESS NOTES
Subjective:   Christine Gabriel is a 64 year old female who presents for Flu (Past 2 weeks,chest pain, back hurts, took antibiotic from [place],)   Patient is a pleasant 64-year-old female past medical history significant for breast cancer status postlumpectomy left, vitamin D deficiency, thyroid disorder secondary to partial thyroidectomy.  Patient presents office today new to this provider for evaluation of reported flu.  Patient states Polo [id number] she is having some congestion in her chest. It comes and goes. She reports she had the flu 2 weeks ago in [place]. She was given PCN for the cough she took it for 3 day two times 500 mg. This did help somewhat. She still has dry cough but has some tightness in chest. No fever no chills. No sore throat. No N/V/D. She took some cough syrup. She had some congestion as well with mild dizziness. This has gotten better. She has not taken anything since being home from [place].        Past Medical History:    Anxiety state    Basal cell carcinoma (BCC) of skin of left lower eyelid including canthus    Breast CA (HCC)    left lumpectomy    Breast cancer, left (HCC)    lumpectomy, SLNB, radiation, tamoxifen, Dr. Mark Reyes, [facility], [facility].\"    Colon polyps    Constipation    Depression    Disorder of thyroid    Exposure to medical diagnostic radiation    Exposure to medical therapeutic radiation    L chest    Iron deficiency anemia    Liver cyst    Liver hemangioma    Menorrhagia    endocervical curettage    PONV (postoperative nausea and vomiting)    Pregnancy (HCC)    , ,     SAB (spontaneous ) (HCC)    Thyroid nodule    Visual impairment      Past Surgical History:   Procedure Laterality Date    Aspir/inj thyroid cyst Right 2022    Breast lumpectomy Left 2010    Chemosurg mohs 1st stage Left     Colonoscopy      Colonoscopy N/A 2022    Procedure: COLONOSCOPY;  Surgeon: Yi Bourgeois MD;   Location: [facility] ENDOSCOPY    Cystoscopy,insert ureteral stent      Endocervical curettage  2010    Excision of lesion, eyelid - os - left eye Left 2014    BCCa    Hysteroscopy  2010    Per [facility]:  \"hysteroscopy - D&C.\"    Lumpectomy left            ,     Radiation left          Sent lymph node biopsy Left 2010        History/Other:    Chief Complaint Reviewed and Verified  Nursing Notes Reviewed and   Verified  Tobacco Reviewed  Allergies Reviewed  Medications Reviewed    Problem List Reviewed  Medical History Reviewed  Surgical History   Reviewed  OB Status Reviewed  Family History Reviewed  Social History   Reviewed         Tobacco:  She has never smoked tobacco.    Current Outpatient Medications   Medication Sig Dispense Refill    fluticasone propionate 50 MCG/ACT Nasal Suspension 2 sprays by Each Nare route daily for 360 doses. 9.9 mL 0    promethazine-dextromethorphan 6.25-15 MG/5ML Oral Syrup Take 5 mL by mouth 4 (four) times daily as needed for cough. 120 mL 0    albuterol 108 (90 Base) MCG/ACT Inhalation Aero Soln Inhale 2 puffs into the lungs every 4 (four) hours as needed. 18 g 0    penicillin v potassium 500 MG Oral Tab Take 1 tablet (500 mg total) by mouth 3 (three) times daily. 30 tablet 0    predniSONE 10 MG Oral Tab Take 1 tablet (10 mg total) by mouth in the morning, at noon, and at bedtime. (Patient not taking: Reported on 2025) 6 tablet 0         Review of Systems:  Review of Systems   Constitutional:  Negative for chills and fever.   HENT:  Positive for congestion and postnasal drip. Negative for sore throat.    Respiratory:  Positive for cough and chest tightness. Negative for shortness of breath and wheezing.    Cardiovascular:  Negative for chest pain.         Objective:   /58 (BP Location: Right arm, Patient Position: Sitting, Cuff Size: large)   Pulse 81   Temp 97.4 °F (36.3 °C) (Oral)   Ht 5' 6\" (1.676 m)   Wt 117 lb 12.8  oz (53.4 kg)   SpO2 98%   BMI 19.01 kg/m²  Estimated body mass index is 19.01 kg/m² as calculated from the following:    Height as of this encounter: 5' 6\" (1.676 m).    Weight as of this encounter: 117 lb 12.8 oz (53.4 kg).  Physical Exam  Vitals and nursing note reviewed.   Constitutional:       Appearance: Normal appearance. She is normal weight.   HENT:      Head: Normocephalic and atraumatic.      Right Ear: Tympanic membrane, ear canal and external ear normal. There is no impacted cerumen.      Left Ear: Tympanic membrane, ear canal and external ear normal. There is no impacted cerumen.      Nose: Congestion and rhinorrhea present.      Comments: Swollen and boggy      Mouth/Throat:      Mouth: Mucous membranes are moist.      Pharynx: Oropharynx is clear.      Comments: PND  Cardiovascular:      Rate and Rhythm: Normal rate and regular rhythm.      Pulses: Normal pulses.      Heart sounds: Normal heart sounds. No murmur heard.  Pulmonary:      Effort: Pulmonary effort is normal. No respiratory distress.      Breath sounds: Normal breath sounds.   Skin:     Capillary Refill: Capillary refill takes less than 2 seconds.   Neurological:      Mental Status: She is alert and oriented to person, place, and time.           Assessment & Plan:   1. Congestion of upper airway (Primary)  -     Promethazine-DM; Take 5 mL by mouth 4 (four) times daily as needed for cough.  Dispense: 120 mL; Refill: 0  2. Cough, unspecified type  -     Promethazine-DM; Take 5 mL by mouth 4 (four) times daily as needed for cough.  Dispense: 120 mL; Refill: 0  3. Dizziness  -     Fluticasone Propionate; 2 sprays by Each Nare route daily for 360 doses.  Dispense: 9.9 mL; Refill: 0  4. Feeling of chest tightness  -     Albuterol Sulfate HFA; Inhale 2 puffs into the lungs every 4 (four) hours as needed.  Dispense: 18 g; Refill: 0    Potential sick contacts  Viral panel in office declined  Supportive measures reviewed and include rotating  antipyretics, using humidifier, hot showers, hot tea with honey.  Increase fluid intake and rest.  Cough suppressants as ordered  Flonase daily for nasal congestion  Resuce albuterol as needed  CXR as needed if persists  Rescue albuterol provided    Patient aware of plan of care. All questions answered to satisfaction of the patient. Patient instructed to call office or reach out via [email or web address] if any issues arise. For urgent issues and/or reviewed red flags please proceed to the urgent care or ER.  Also, inform the nurse practitioner with any new symptoms or medication side effects.          Return if symptoms worsen or fail to improve.    Bhavin Kumar, APRN, 4/3/2025, 12:26 PM

## 2025-04-04 ENCOUNTER — OFFICE VISIT (OUTPATIENT)
Dept: FAMILY MEDICINE CLINIC | Facility: CLINIC | Age: 65
End: 2025-04-04

## 2025-04-04 VITALS
HEART RATE: 81 BPM | DIASTOLIC BLOOD PRESSURE: 58 MMHG | WEIGHT: 117.81 LBS | BODY MASS INDEX: 18.93 KG/M2 | HEIGHT: 66 IN | SYSTOLIC BLOOD PRESSURE: 102 MMHG | TEMPERATURE: 97 F | OXYGEN SATURATION: 98 %

## 2025-04-04 DIAGNOSIS — R05.9 COUGH, UNSPECIFIED TYPE: ICD-10-CM

## 2025-04-04 DIAGNOSIS — R07.89 FEELING OF CHEST TIGHTNESS: ICD-10-CM

## 2025-04-04 DIAGNOSIS — R42 DIZZINESS: ICD-10-CM

## 2025-04-04 DIAGNOSIS — J98.8 CONGESTION OF UPPER AIRWAY: Primary | ICD-10-CM

## 2025-04-04 PROCEDURE — 99213 OFFICE O/P EST LOW 20 MIN: CPT

## 2025-04-04 RX ORDER — FLUTICASONE PROPIONATE 50 MCG
2 SPRAY, SUSPENSION (ML) NASAL DAILY
Qty: 9.9 ML | Refills: 0 | Status: SHIPPED | OUTPATIENT
Start: 2025-04-04 | End: 2026-03-30

## 2025-04-04 RX ORDER — ALBUTEROL SULFATE 90 UG/1
2 INHALANT RESPIRATORY (INHALATION) EVERY 4 HOURS PRN
Qty: 18 G | Refills: 0 | Status: SHIPPED | OUTPATIENT
Start: 2025-04-04

## 2025-04-04 RX ORDER — DEXTROMETHORPHAN HYDROBROMIDE AND PROMETHAZINE HYDROCHLORIDE 15; 6.25 MG/5ML; MG/5ML
5 SYRUP ORAL 4 TIMES DAILY PRN
Qty: 120 ML | Refills: 0 | Status: SHIPPED | OUTPATIENT
Start: 2025-04-04

## (undated) DEVICE — SUCTION CANISTER, 3000CC,SAFELINER: Brand: DEROYAL

## (undated) DEVICE — UNDYED BRAIDED (POLYGLACTIN 910), SYNTHETIC ABSORBABLE SUTURE: Brand: COATED VICRYL

## (undated) DEVICE — GAMMEX® PI HYBRID SIZE 7.5, STERILE POWDER-FREE SURGICAL GLOVE, POLYISOPRENE AND NEOPRENE BLEND: Brand: GAMMEX

## (undated) DEVICE — SUT CHRM GUT 3-0 27IN SH ABSRB UD 26MM 1/2

## (undated) DEVICE — GOWN,SIRUS,FAB REINF,RAGLAN,XL,STERILE: Brand: MEDLINE

## (undated) DEVICE — PACK CDS HEAD

## (undated) DEVICE — 35 ML SYRINGE REGULAR TIP: Brand: MONOJECT

## (undated) DEVICE — KIT ENDO ORCAPOD 160/180/190

## (undated) DEVICE — DRAPE SRG 26X15IN UTL TPE STRL

## (undated) DEVICE — DRAPE SHEET LAPAROTOMY

## (undated) DEVICE — LINE MNTR ADLT SET O2 INTMD

## (undated) DEVICE — SUT PROL 4-0 18IN N ABSRB BLU L19MM FS-2

## (undated) DEVICE — SUT VICRYL 3-0 SH J416H

## (undated) DEVICE — APPLICATOR PREP 26ML CHG 2% ISO ALC 70%

## (undated) DEVICE — DRAPE SHEET TRANSVERSE LAP

## (undated) DEVICE — SOLUTION IRRIG 1000ML 0.9% NACL USP BTL

## (undated) DEVICE — POWDER HEMSTAT 3GM OXIDIZED REGENERATED CELOS

## (undated) DEVICE — FORCEP RADIAL JAW 4

## (undated) DEVICE — SOL NACL IRRIG 0.9% 1000ML BTL

## (undated) DEVICE — MINOR GENERAL: Brand: MEDLINE INDUSTRIES, INC.

## (undated) DEVICE — GOWN SURG AERO BLUE PERF LG

## (undated) DEVICE — KIT CLEAN ENDOKIT 1.1OZ GOWNX2

## (undated) DEVICE — PACK CUSTTOM NECK ACCESSORY

## (undated) DEVICE — MASK PROC W/VISOR ANTIGLARE

## (undated) DEVICE — SUT PERMA- 2-0 18IN NABSRB BLK TIE SILK

## (undated) DEVICE — MEGADYNE E-Z CLEAN BLADE 2.75"

## (undated) DEVICE — ADHESIVE SKIN TOP FOR WND CLSR DERMBND ADV

## (undated) DEVICE — GAMMEX® PI HYBRID SIZE 8, STERILE POWDER-FREE SURGICAL GLOVE, POLYISOPRENE AND NEOPRENE BLEND: Brand: GAMMEX

## (undated) DEVICE — ENCORE® LATEX MICRO SIZE 8, STERILE LATEX POWDER-FREE SURGICAL GLOVE: Brand: ENCORE

## (undated) DEVICE — DERMABOND CLOSURE 0.7ML TOPICL

## (undated) DEVICE — MEDI-VAC NON-CONDUCTIVE SUCTION TUBING 6MM X 1.8M (6FT.) L: Brand: CARDINAL HEALTH

## (undated) NOTE — LETTER
[facility]  [address] [address] [address]  [phone]  Authorization for Imaging Procedure    1. I hereby authorize Dr. Nate Walls physician and his/her assistants (if applicable), which may include medical students, residents, and/or fellows, to perform the following procedure and administer such anesthesia as may be determined necessary by my physician: ULTRASOUND GUIDED FINE NEEDLE ASPIRATION AND BIOPSY RIGHT THYROID on [address]. 2.  I recognize that during the procedure, unforeseen conditions may necessitate additional or different procedures than those listed above. I, therefore, further authorize and request that the above-named physician, assistants, or designees perform such procedures as are, in their judgment, necessary and desirable. 3.  My physician has discussed prior to my procedure the potential benefits, risks and side effects of this procedure; the likelihood of achieving goals; and potential problems that might occur during recuperation. They also discussed reasonable alternatives to the procedure, including risks, benefits, and side effects related to the alternatives and risks related to not receiving this procedure. I have had all my questions answered and I acknowledge that no guarantee has been made as to the result that may be obtained. 4.  Should the need arise during my procedure, which includes change of level of care prior to discharge, I also consent to the administration of blood and/or blood products. Further, I understand that despite careful testing and screening of blood or blood products by collecting agencies, I may still be subject to ill effects as a result of receiving a blood transfusion and/or blood products.  The following are some, but not all, of the potential risks that can occur: fever and allergic reactions, hemolytic reactions, transmission of diseases such as Hepatitis, AIDS and Cytomegalovirus (CMV) and fluid overload. In the event that I wish to have an autologous transfusion of my own blood, or a directed donor transfusion, I will discuss this with my physician. Check only if Refusing Blood or Blood Products  I understand refusal of blood or blood products as deemed necessary by my physician may have serious consequences to my condition to include possible death. I hereby assume responsibility for my refusal and release the hospital, its personnel, and my physicians from any responsibility for the consequences of my refusal.   [  ] Patient Refuses Blood      5. I authorize the use of any specimen, organs, tissues, body parts or foreign objects that may be removed from my body during the procedure for diagnosis, research or teaching purposes and their subsequent disposal by hospital authorities. I also authorize the release of specimen test results and/or written reports to my treating physician on the hospital medical staff or other referring or consulting physicians involved in my care, at the discretion of the Pathologist or my treating physician. 6.  I consent to the photographing or videotaping of the procedures to be performed, including appropriate portions of my body for medical, scientific, or educational purposes, provided my identity is not revealed by the pictures or by descriptive texts accompanying them. If the procedure has been photographed/videotaped, the physician will obtain the original picture, image, videotape or CD. The hospital will not be responsible for storage, release or maintenance of the picture, image, tape or CD.   7.  I consent to the presence of a  or observers in the operating room as deemed necessary by my physician or their designees. 8.  I recognize that in the event my procedure results in extended X-Ray/fluoroscopy time, I may develop a skin reaction. 9.   If I have a Do Not Attempt Resuscitation (DNAR) order in place, that status will be suspended while in the operating room, procedural suite, and during the recovery period unless otherwise explicitly stated by me (or a person authorized to consent on my behalf). The performing physician or my attending physician will determine when the applicable recovery period ends for purposes of reinstating the DNAR order. 10.  I acknowledge that my physician has explained sedation/analgesia administration to me including the risk and benefits I consent to the administration of sedation/analgesia as may be necessary or desirable in the judgment of my physician. I CERTIFY THAT I HAVE READ AND FULLY UNDERSTAND THE ABOVE CONSENT FOR THE PROCEDURE.    Signature of Patient: _____________________________________________________________      Responsible person in case of minor, unconscious: ____________________________________  Relationship to patient:  __________________________________________________________    Signature of Witness: _______________________________Date: _________Time: __________      Signature of Provider: _______________________________Date: _________Time: __________      Patient Name: Yamilka Dutton : 1960  Printed: 2022   Medical Record #: [id number]

## (undated) NOTE — LETTER
06/22/21        [address] [address]      Dear Shannon Harkins records indicate that you have outstanding lab work and or testing that was ordered for you and has not yet been completed:  Orders Placed This Encount

## (undated) NOTE — LETTER
AUTHORIZATION FOR SURGICAL OPERATION OR OTHER PROCEDURE    1. I hereby authorize Franki Cooper, and [facility] staff assigned to my case to perform the following operation and/or procedure at the [facility] site:    _______________________________________________________________________________________________    Fine Needle Aspiration of Neck Swelling  _______________________________________________________________________________________________    2.  My physician has explained the nature and purpose of the operation or other procedure, possible alternative methods of treatment, the risks involved, and the possibility of complication to me.  I acknowledge that no guarantee has been made as to the result that may be obtained.  3.  I recognize that, during the course of this operation, or other procedure, unforseen conditions may necessitate additional or different procedure than those listed above.  I, therefore, further authorize and request that the above named physician, his/her physician assistants or designees perform such procedures as are, in his/her professional opinion, necessary and desirable.  4.  Any tissue or organs removed in the operation or other procedure may be disposed of by and at the discretion of the [facility] and [facility].  5.  I understand that in the event of a medical emergency, I will be transported by local paramedics to [facility] or other hospital emergency department.  6.  I certify that I have read and fully understand the above consent to operation and/or other procedure.    7.  I acknowledge that my physician has explained sedation/analgesia administration to me including the risks and benefits.  I consent to the administration of sedation/analgesia as may be necessary or desirable in the judgement of my physician.    Witness signature: ___________________________________________________ Date:   ______/______/_____                    Time:  ________ A.M.  P.M.       Patient Name:  ______________________________________________________  (please print)      Patient signature:  ___________________________________________________             Relationship to Patient:           []  Parent    Responsible person                          []  Spouse  In case of minor or                    [] Other  _____________   Incompetent name:  __________________________________________________                               (please print)      _____________      Responsible person  In case of minor or  Incompetent signature:  _______________________________________________    Statement of Physician  My signature below affirms that prior to the time of the procedure, I have explained to the patient and/or his/her guardian, the risks and benefits involved in the proposed treatment and any reasonable alternative to the proposed treatment.  I have also explained the risks and benefits involved in the refusal of the proposed treatment and have answered the patient's questions.                        Date:  ______/______/_______  Provider                      Signature:  __________________________________________________________       Time:  ___________ A.M    P.M.

## (undated) NOTE — LETTER
11/18/20        [address] [facility]  [place] [id number]      Dear Randi Kirk records indicate that you have outstanding lab work and or testing that was ordered for you and has not yet been completed:  Orders Placed This Encount

## (undated) NOTE — LETTER
09/18/20        [address] [facility] [address]      Dear Pari Bonilla records indicate that you have outstanding lab work and or testing that was ordered for you and has not yet been completed:  Orders Placed This Encount

## (undated) NOTE — LETTER
[address], [place]  Authorization for Invasive Procedures  1. I hereby authorize Dr. Shant Begum , my physician and whomever may be designated as the doctor's assistant, to perform the following operation and/or procedure:  *** on [address] at [facility].    2. My physician has explained to me the nature and purpose of the operation or other procedure, possible alternative methods of treatment, the risks involved and the possibility of complications to me. I understand the probable consequences of declining the recommended procedure and the alternative methods of treatment. I acknowledge that no guarantee has been made as to the result that may be obtained. 3. I recognize that during the course of this operation or other procedure, unforeseen conditions may necessitate additional or different procedures than those listed above. I, therefore, further authorize and request that the above-named physician, his/her physician assistants, or designees perform such procedures as are, in his/her professional opinion, necessary and desirable. If I have a Do Not Attempt Resuscitation (DNAR) order in place, that status will be suspended while in the operating room, procedural suite, and during the recovery period unless otherwise explicitly stated by me (or a person authorized to consent on my behalf). The surgeon or my attending physician will determine when the applicable recovery period ends for purposes of reinstating the DNAR order. 4. Should the need arise during my operation or immediate post-operative period; I also consent to the administration of blood and/or blood products.  Further, I understand that despite careful testing and screening of blood and blood products, I may still be subject to ill effects as a result of recieving a blood transfusion an/or blood producst. The following are some, but not all, of the potential risks that can occur: fever and allergic reactions, hemolytic reactions, transmission of disease such as hepatitis, AIDS, cytomegalovirus (CMV), and flluid overload. In the event that I wish to have autologous transfusions of my own blood, or a directed donor transfusion, I will discuss this with my physician. 5. I consent to the photographing of the operations or procedures to be performed for the purposes of advancing medicine, science, and/or education, provided my identity is not revealed. If the procedure has been videotaped, the physician/surgeon will obtain the original videotape. The hospital will not be responsible for storage or maintenance of this tape. 6. I consent to the presence of a  or observer as deemed necessary by my physician or his designee. 7. Any tissues or organs removed in the operation or other procedure may be disposed of by and at the discretion of [facility].    8. I understand that the physician and his/her physician assistants may not be employees or agents of [facility], [facility], [facility], but are independent medical practitioners who have been permitted to use its facilities for the care and treatment of their patients. 9. Patients having a sterilization procedure: I understand that if the procedure is successful the results will be permanent and it will therefore be impossible for me to inseminate, conceive or bear children. I also understand that the procedure is intended to result in sterility, although the result has not been guaranteed. 10. I CERTIFY THAT I HAVE READ AND FULLY UNDERSTAND THE ABOVE CONSENT TO OPERATION and/or OTHER PROCEDURE. 11. I acknowledge that my physician has explained sedation/analgesia administration to me including the risks and benefits. I consent to the administration of sedation/analgesia as may be necessary or desirable in the judgment of my physician.      Signature of Patient: ________________________________________________ Date: _________Time: _________    Responsible person in case of minor or unconscious: _____________________________Relationship: ____________     Witness Signature: ____________________________________________ Date: __________ Time: ___________    Statement of Physician  My signature below affirms that prior to the time of the procedure, I have explained to the patient and/or her legal representative, the risks and benefits involved in the proposed treatment and any reasonable alternative to the proposed treatment. I have also explained the risks and benefits involved in the refusal of the proposed treatment and have answered the patient's questions. If I have a significant financial interest in this procedure/surgery, I have disclosed this and had a discussion with my patient.     Signature of Physician:   ________________________________________Date: _________Time:_______ Patient Name: Rock Resendiz  : 1960   Printed: July 15, 2022    Medical Record #: [id number]

## (undated) NOTE — LETTER
[address], [place]  Authorization for Invasive Procedures  1. I hereby authorize Dr. Christy Recinos , my physician and whomever may be designated as the doctor's assistant, to perform the following operation and/or procedure:  *** on [address] at [facility].    2. My physician has explained to me the nature and purpose of the operation or other procedure, possible alternative methods of treatment, the risks involved and the possibility of complications to me. I understand the probable consequences of declining the recommended procedure and the alternative methods of treatment. I acknowledge that no guarantee has been made as to the result that may be obtained. 3. I recognize that during the course of this operation or other procedure, unforeseen conditions may necessitate additional or different procedures than those listed above. I, therefore, further authorize and request that the above-named physician, his/her physician assistants, or designees perform such procedures as are, in his/her professional opinion, necessary and desirable. If I have a Do Not Attempt Resuscitation (DNAR) order in place, that status will be suspended while in the operating room, procedural suite, and during the recovery period unless otherwise explicitly stated by me (or a person authorized to consent on my behalf). The surgeon or my attending physician will determine when the applicable recovery period ends for purposes of reinstating the DNAR order. 4. Should the need arise during my operation or immediate post-operative period; I also consent to the administration of blood and/or blood products.  Further, I understand that despite careful testing and screening of blood and blood products, I may still be subject to ill effects as a result of recieving a blood transfusion an/or blood producst. The following are some, but not all, of the potential risks that can occur: fever and allergic reactions, hemolytic reactions, transmission of disease such as hepatitis, AIDS, cytomegalovirus (CMV), and flluid overload. In the event that I wish to have autologous transfusions of my own blood, or a directed donor transfusion, I will discuss this with my physician. 5. I consent to the photographing of the operations or procedures to be performed for the purposes of advancing medicine, science, and/or education, provided my identity is not revealed. If the procedure has been videotaped, the physician/surgeon will obtain the original videotape. The hospital will not be responsible for storage or maintenance of this tape. 6. I consent to the presence of a  or observer as deemed necessary by my physician or his designee. 7. Any tissues or organs removed in the operation or other procedure may be disposed of by and at the discretion of [facility].    8. I understand that the physician and his/her physician assistants may not be employees or agents of [facility], [facility], [facility], but are independent medical practitioners who have been permitted to use its facilities for the care and treatment of their patients. 9. Patients having a sterilization procedure: I understand that if the procedure is successful the results will be permanent and it will therefore be impossible for me to inseminate, conceive or bear children. I also understand that the procedure is intended to result in sterility, although the result has not been guaranteed. 10. I CERTIFY THAT I HAVE READ AND FULLY UNDERSTAND THE ABOVE CONSENT TO OPERATION and/or OTHER PROCEDURE. 11. I acknowledge that my physician has explained sedation/analgesia administration to me including the risks and benefits. I consent to the administration of sedation/analgesia as may be necessary or desirable in the judgment of my physician.      Signature of Patient: ________________________________________________ Date: _________Time: _________    Responsible person in case of minor or unconscious: _____________________________Relationship: ____________     Witness Signature: ____________________________________________ Date: __________ Time: ___________    Statement of Physician  My signature below affirms that prior to the time of the procedure, I have explained to the patient and/or her legal representative, the risks and benefits involved in the proposed treatment and any reasonable alternative to the proposed treatment. I have also explained the risks and benefits involved in the refusal of the proposed treatment and have answered the patient's questions. If I have a significant financial interest in this procedure/surgery, I have disclosed this and had a discussion with my patient.     Signature of Physician:   ________________________________________Date: _________Time:_______ Patient Name: Tacho Ku  : 1960   Printed: July 15, 2022    Medical Record #: [id number]

## (undated) NOTE — LETTER
AUTHORIZATION FOR SURGICAL OPERATION OR OTHER PROCEDURE    1. I hereby authorize Dr. Franki Milner , and [facility] staff assigned to my case to perform the following operation and/or procedure at the [facility] site:    _______________________________________________________________________________________________    Incision and drainage of neck mass   _______________________________________________________________________________________________    2.  My physician has explained the nature and purpose of the operation or other procedure, possible alternative methods of treatment, the risks involved, and the possibility of complication to me.  I acknowledge that no guarantee has been made as to the result that may be obtained.  3.  I recognize that, during the course of this operation, or other procedure, unforseen conditions may necessitate additional or different procedure than those listed above.  I, therefore, further authorize and request that the above named physician, his/her physician assistants or designees perform such procedures as are, in his/her professional opinion, necessary and desirable.  4.  Any tissue or organs removed in the operation or other procedure may be disposed of by and at the discretion of the [facility] and [facility].  5.  I understand that in the event of a medical emergency, I will be transported by local paramedics to [facility] or other hospital emergency department.  6.  I certify that I have read and fully understand the above consent to operation and/or other procedure.    7.  I acknowledge that my physician has explained sedation/analgesia administration to me including the risks and benefits.  I consent to the administration of sedation/analgesia as may be necessary or desirable in the judgement of my physician.    Witness signature: ___________________________________________________ Date:   ______/______/_____                    Time:  ________ A.M.  P.M.       Patient Name:  ______________________________________________________  (please print)      Patient signature:  ___________________________________________________             Relationship to Patient:           []  Parent    Responsible person                          []  Spouse  In case of minor or                    [] Other  _____________   Incompetent name:  __________________________________________________                               (please print)      _____________      Responsible person  In case of minor or  Incompetent signature:  _______________________________________________    Statement of Physician  My signature below affirms that prior to the time of the procedure, I have explained to the patient and/or his/her guardian, the risks and benefits involved in the proposed treatment and any reasonable alternative to the proposed treatment.  I have also explained the risks and benefits involved in the refusal of the proposed treatment and have answered the patient's questions.                        Date:  ______/______/_______  Provider                      Signature:  __________________________________________________________       Time:  ___________ A.M    P.M.

## (undated) NOTE — LETTER
[facility] ULTRASOUND  [address] [facility] [address]  Authorization for Imaging Procedure  Date of Procedure:     I hereby authorize  ___________, my physician and his/her assistants (if applicable), which may include medical students, residents, and/or fellows, to perform the following procedure and administer such anesthesia as may be determined necessary by my physician: ULTRASOUND GUIDED FINE NEEDLE ASPIRATION BIOPSY OF RIGHT THYROID NODULE on [address]rit[address] Gabriel.   2.  I recognize that during the procedure, unforeseen conditions may necessitate additional or different procedures than those listed above. I, therefore, further authorize and request that the above-named physician, assistants, or designees perform such procedures as are, in their judgment, necessary and desirable.    3.  My physician has discussed prior to my procedure the potential benefits, risks and side effects of this procedure; the likelihood of achieving goals; and potential problems that might occur during recuperation. They also discussed reasonable alternatives to the procedure, including risks, benefits, and side effects related to the alternatives and risks related to not receiving this procedure. I have had all my questions answered and I acknowledge that no guarantee has been made as to the result that may be obtained.    4.  Should the need arise during my procedure, which includes change of level of care prior to discharge, I also consent to the administration of blood and/or blood products. Further, I understand that despite careful testing and screening of blood or blood products by collecting agencies, I may still be subject to ill effects as a result of receiving a blood transfusion and/or blood products. The following are some, but not all, of the potential risks that can occur: fever and allergic reactions, hemolytic reactions, transmission of diseases such as Hepatitis, AIDS and  Cytomegalovirus (CMV) and fluid overload. In the event that I wish to have an autologous transfusion of my own blood, or a directed donor transfusion, I will discuss this with my physician.  Check only if Refusing Blood or Blood Products  I understand refusal of blood or blood products as deemed necessary by my physician may have serious consequences to my condition to include possible death. I hereby assume responsibility for my refusal and release the hospital, its personnel, and my physicians from any responsibility for the consequences of my refusal.   [  ] Patient Refuses Blood      5.  I authorize the use of any specimen, organs, tissues, body parts or foreign objects that may be removed from my body during the procedure for diagnosis, research or teaching purposes and their subsequent disposal by hospital authorities. I also authorize the release of specimen test results and/or written reports to my treating physician on the hospital medical staff or other referring or consulting physicians involved in my care, at the discretion of the Pathologist or my treating physician.    6.  I consent to the photographing or videotaping of the procedures to be performed, including appropriate portions of my body for medical, scientific, or educational purposes, provided my identity is not revealed by the pictures or by descriptive texts accompanying them. If the procedure has been photographed/videotaped, the physician will obtain the original picture, image, videotape or CD. The hospital will not be responsible for storage, release or maintenance of the picture, image, tape or CD.   7.  I consent to the presence of a  or observers in the operating room as deemed necessary by my physician or their designees.    8.  I recognize that in the event my procedure results in extended X-Ray/fluoroscopy time, I may develop a skin reaction.    9.  If I have a Do Not Attempt Resuscitation (DNAR) order in place,  that status will be suspended while in the operating room, procedural suite, and during the recovery period unless otherwise explicitly stated by me (or a person authorized to consent on my behalf). The performing physician or my attending physician will determine when the applicable recovery period ends for purposes of reinstating the DNAR order.  10.  I acknowledge that my physician has explained sedation/analgesia administration to me including the risk and benefits I consent to the administration of sedation/analgesia as may be necessary or desirable in the judgment of my physician.      I CERTIFY THAT I HAVE READ AND FULLY UNDERSTAND THE ABOVE CONSENT FOR THE PROCEDURE.   Signature of Patient: _____________________________________________________________  Responsible person in case of minor, unconscious: ____________________________________  Relationship to patient:  __________________________________________________________  Signature of Witness: _______________________________Date: _________Time: __________    Statement of Physician: My signature below affirms that prior to the time of the procedure, I have explained to the patient and/or her guardian, the risks and benefits involved in the proposed treatment and any reasonable alternative to the proposed treatment. I have also explained the risks and benefits involved in the refusal of the proposed treatment and have answered the patient's questions. If I have a significant financial interest in a co-management agreement or a significant financial interest in any product or implant, or other significant relationship used in the procedure/surgery, I have disclosed this and had a discussion with my patient.  Signature of Physician:   _________________________________Date:_____________Time:________    Patient Name: Christine Gabriel : 1960  Printed: 2024   Medical Record #: [id number]

## (undated) NOTE — LETTER
02/18/21    [address] [facility] [address]      Dear Juarez Mosley records indicate that you have outstanding lab work and or testing that was ordered for you and has not yet been completed:  Orders Placed This Encounter